# Patient Record
Sex: FEMALE | Race: WHITE
[De-identification: names, ages, dates, MRNs, and addresses within clinical notes are randomized per-mention and may not be internally consistent; named-entity substitution may affect disease eponyms.]

---

## 2020-03-10 ENCOUNTER — HOSPITAL ENCOUNTER (OUTPATIENT)
Dept: HOSPITAL 19 - SDCO | Age: 65
Discharge: HOME | End: 2020-03-10
Attending: SURGERY
Payer: COMMERCIAL

## 2020-03-10 VITALS — DIASTOLIC BLOOD PRESSURE: 61 MMHG | HEART RATE: 65 BPM | SYSTOLIC BLOOD PRESSURE: 99 MMHG

## 2020-03-10 VITALS — WEIGHT: 213.63 LBS | HEIGHT: 63 IN | BODY MASS INDEX: 37.85 KG/M2

## 2020-03-10 VITALS — SYSTOLIC BLOOD PRESSURE: 81 MMHG | DIASTOLIC BLOOD PRESSURE: 53 MMHG | HEART RATE: 73 BPM

## 2020-03-10 VITALS — DIASTOLIC BLOOD PRESSURE: 53 MMHG | HEART RATE: 56 BPM | SYSTOLIC BLOOD PRESSURE: 94 MMHG

## 2020-03-10 VITALS — TEMPERATURE: 97.4 F | HEART RATE: 78 BPM | SYSTOLIC BLOOD PRESSURE: 86 MMHG | DIASTOLIC BLOOD PRESSURE: 52 MMHG

## 2020-03-10 VITALS — TEMPERATURE: 97.3 F | HEART RATE: 74 BPM | DIASTOLIC BLOOD PRESSURE: 54 MMHG | SYSTOLIC BLOOD PRESSURE: 95 MMHG

## 2020-03-10 DIAGNOSIS — C50.412: ICD-10-CM

## 2020-03-10 DIAGNOSIS — T82.599A: Primary | ICD-10-CM

## 2020-03-10 DIAGNOSIS — I50.9: ICD-10-CM

## 2020-03-10 DIAGNOSIS — Z79.51: ICD-10-CM

## 2020-03-10 DIAGNOSIS — M81.0: ICD-10-CM

## 2020-03-10 DIAGNOSIS — I42.9: ICD-10-CM

## 2020-03-10 DIAGNOSIS — Z88.0: ICD-10-CM

## 2020-03-10 NOTE — NUR
TO PER CART FROM OR. ALERT ORIENTED X 3, TALKING TO STAFF AND DAUGHTER.
PORT ACCESSED FOR CHEMO TX IN AM.  DRESSING OVER SITE WITHOUT DRAINAGE.

## 2020-03-10 NOTE — NUR
RECEIVED DISCHARGE INSTRUCTIONS AND VERBALIZED UNDERSTANDING.
DISCONTINUED IV AND INT- CATHETER INTACT
DAUGHTER ASSISTING PATIENT DRESSED.

## 2020-04-07 ENCOUNTER — HOSPITAL ENCOUNTER (INPATIENT)
Dept: HOSPITAL 19 - COL.ER | Age: 65
LOS: 2 days | Discharge: HOME | DRG: 683 | End: 2020-04-09
Attending: FAMILY MEDICINE | Admitting: FAMILY MEDICINE
Payer: COMMERCIAL

## 2020-04-07 VITALS — TEMPERATURE: 98.1 F | SYSTOLIC BLOOD PRESSURE: 104 MMHG | HEART RATE: 68 BPM | DIASTOLIC BLOOD PRESSURE: 56 MMHG

## 2020-04-07 VITALS — WEIGHT: 211.2 LBS | BODY MASS INDEX: 37.42 KG/M2 | HEIGHT: 62.99 IN

## 2020-04-07 DIAGNOSIS — I95.9: ICD-10-CM

## 2020-04-07 DIAGNOSIS — I47.2: ICD-10-CM

## 2020-04-07 DIAGNOSIS — I42.9: ICD-10-CM

## 2020-04-07 DIAGNOSIS — N39.0: ICD-10-CM

## 2020-04-07 DIAGNOSIS — C79.51: ICD-10-CM

## 2020-04-07 DIAGNOSIS — R19.7: ICD-10-CM

## 2020-04-07 DIAGNOSIS — N17.9: Primary | ICD-10-CM

## 2020-04-07 DIAGNOSIS — E83.42: ICD-10-CM

## 2020-04-07 DIAGNOSIS — Z95.810: ICD-10-CM

## 2020-04-07 DIAGNOSIS — Z88.0: ICD-10-CM

## 2020-04-07 DIAGNOSIS — D64.9: ICD-10-CM

## 2020-04-07 DIAGNOSIS — C50.412: ICD-10-CM

## 2020-04-07 DIAGNOSIS — K20.9: ICD-10-CM

## 2020-04-07 DIAGNOSIS — R53.81: ICD-10-CM

## 2020-04-07 DIAGNOSIS — I50.9: ICD-10-CM

## 2020-04-07 DIAGNOSIS — Z66: ICD-10-CM

## 2020-04-07 DIAGNOSIS — M81.0: ICD-10-CM

## 2020-04-07 DIAGNOSIS — E86.0: ICD-10-CM

## 2020-04-07 LAB
ALBUMIN SERPL-MCNC: 4.2 GM/DL (ref 3.5–5)
ALP SERPL-CCNC: 102 U/L (ref 50–136)
ALT SERPL-CCNC: 10 U/L (ref 4–34)
ANION GAP SERPL CALC-SCNC: 10 MMOL/L (ref 7–16)
AST SERPL-CCNC: 25 U/L (ref 15–37)
BASOPHILS # BLD: 0 10*3/UL (ref 0–0.2)
BASOPHILS NFR BLD AUTO: 1 % (ref 0–2)
BILIRUB SERPL-MCNC: 0.4 MG/DL (ref 0–1)
BUN SERPL-MCNC: 63 MG/DL (ref 7–17)
CALCIUM SERPL-MCNC: 9.1 MG/DL (ref 8.4–10.2)
CHLORIDE SERPL-SCNC: 109 MMOL/L (ref 98–107)
CO2 SERPL-SCNC: 15 MMOL/L (ref 22–30)
CREAT SERPL-SCNC: 4.38 UMOL/L (ref 0.52–1.25)
CRP SERPL-MCNC: < 0.5 MG/DL (ref 0–0.9)
EOSINOPHIL # BLD: 0 10*3/UL (ref 0–0.7)
EOSINOPHIL NFR BLD: 1 % (ref 0–4)
ERYTHROCYTE [DISTWIDTH] IN BLOOD BY AUTOMATED COUNT: 17.2 % (ref 11.5–14.5)
GLUCOSE SERPL-MCNC: 95 MG/DL (ref 74–106)
GRANULOCYTES # BLD AUTO: 52.9 % (ref 42.2–75.2)
HCT VFR BLD AUTO: 29.5 % (ref 37–47)
HGB BLD-MCNC: 9.6 G/DL (ref 12.5–16)
LYMPHOCYTES # BLD: 1.6 10*3/UL (ref 1.2–3.4)
LYMPHOCYTES NFR BLD: 38.2 % (ref 20–51)
MAGNESIUM SERPL-MCNC: 1.5 MG/DL (ref 1.6–2.3)
MCH RBC QN AUTO: 32 PG (ref 27–31)
MCHC RBC AUTO-ENTMCNC: 33 G/DL (ref 33–37)
MCV RBC AUTO: 99 FL (ref 80–100)
MONOCYTES # BLD: 0.3 10*3/UL (ref 0.1–0.6)
MONOCYTES NFR BLD AUTO: 6.4 % (ref 1.7–9.3)
NEUTROPHILS # BLD: 2.2 10*3/UL (ref 1.4–6.5)
OSMOLALITY UR: 369 OSM/KG (ref 50–1200)
PH UR STRIP.AUTO: 5 [PH] (ref 5–8)
PHOSPHATE SERPL-MCNC: 4.4 MG/DL (ref 2.5–4.5)
PLATELET # BLD AUTO: 319 K/MM3 (ref 130–400)
PMV BLD AUTO: 10.2 FL (ref 7.4–10.4)
POTASSIUM SERPL-SCNC: 4.2 MMOL/L (ref 3.4–5)
PROT SERPL-MCNC: 6.8 GM/DL (ref 6.4–8.2)
RBC # BLD AUTO: 2.99 M/MM3 (ref 4.1–5.3)
RBC # UR STRIP.AUTO: (no result) /UL
RBC # UR: (no result) /HPF
SODIUM SERPL-SCNC: 135 MMOL/L (ref 137–145)
SODIUM UR-SCNC: 24 MMOL/L
SP GR UR STRIP.AUTO: 1.01 (ref 1–1.03)
SQUAMOUS # URNS: (no result) /HPF
UA DIPSTICK PNL UR STRIP.AUTO: (no result)
URINE LEUKOCYTE ESTERASE: (no result)
URN COLLECT METHOD CLASS: (no result)

## 2020-04-07 PROCEDURE — C9113 INJ PANTOPRAZOLE SODIUM, VIA: HCPCS

## 2020-04-07 PROCEDURE — A4216 STERILE WATER/SALINE, 10 ML: HCPCS

## 2020-04-08 VITALS — DIASTOLIC BLOOD PRESSURE: 48 MMHG | TEMPERATURE: 97.8 F | HEART RATE: 52 BPM | SYSTOLIC BLOOD PRESSURE: 80 MMHG

## 2020-04-08 VITALS — DIASTOLIC BLOOD PRESSURE: 44 MMHG | HEART RATE: 67 BPM | SYSTOLIC BLOOD PRESSURE: 86 MMHG

## 2020-04-08 VITALS — SYSTOLIC BLOOD PRESSURE: 77 MMHG | DIASTOLIC BLOOD PRESSURE: 48 MMHG | HEART RATE: 60 BPM

## 2020-04-08 VITALS — DIASTOLIC BLOOD PRESSURE: 43 MMHG | SYSTOLIC BLOOD PRESSURE: 86 MMHG | HEART RATE: 66 BPM

## 2020-04-08 VITALS — DIASTOLIC BLOOD PRESSURE: 50 MMHG | SYSTOLIC BLOOD PRESSURE: 90 MMHG | TEMPERATURE: 97.7 F | HEART RATE: 60 BPM

## 2020-04-08 VITALS — SYSTOLIC BLOOD PRESSURE: 85 MMHG | TEMPERATURE: 97.8 F | DIASTOLIC BLOOD PRESSURE: 52 MMHG | HEART RATE: 61 BPM

## 2020-04-08 VITALS — SYSTOLIC BLOOD PRESSURE: 99 MMHG | HEART RATE: 69 BPM | DIASTOLIC BLOOD PRESSURE: 51 MMHG | TEMPERATURE: 97 F

## 2020-04-08 VITALS — HEART RATE: 58 BPM | DIASTOLIC BLOOD PRESSURE: 55 MMHG | SYSTOLIC BLOOD PRESSURE: 95 MMHG

## 2020-04-08 VITALS — DIASTOLIC BLOOD PRESSURE: 35 MMHG | HEART RATE: 72 BPM | SYSTOLIC BLOOD PRESSURE: 71 MMHG

## 2020-04-08 VITALS — DIASTOLIC BLOOD PRESSURE: 43 MMHG | SYSTOLIC BLOOD PRESSURE: 88 MMHG

## 2020-04-08 VITALS — SYSTOLIC BLOOD PRESSURE: 91 MMHG | DIASTOLIC BLOOD PRESSURE: 52 MMHG | TEMPERATURE: 97.9 F | HEART RATE: 58 BPM

## 2020-04-08 VITALS — HEART RATE: 69 BPM | DIASTOLIC BLOOD PRESSURE: 49 MMHG | TEMPERATURE: 98.1 F | SYSTOLIC BLOOD PRESSURE: 84 MMHG

## 2020-04-08 VITALS — HEART RATE: 59 BPM | SYSTOLIC BLOOD PRESSURE: 98 MMHG | DIASTOLIC BLOOD PRESSURE: 55 MMHG

## 2020-04-08 VITALS — HEART RATE: 66 BPM | TEMPERATURE: 98 F | DIASTOLIC BLOOD PRESSURE: 48 MMHG | SYSTOLIC BLOOD PRESSURE: 89 MMHG

## 2020-04-08 VITALS — DIASTOLIC BLOOD PRESSURE: 54 MMHG | SYSTOLIC BLOOD PRESSURE: 96 MMHG | HEART RATE: 66 BPM

## 2020-04-08 VITALS — TEMPERATURE: 97.7 F | SYSTOLIC BLOOD PRESSURE: 76 MMHG | HEART RATE: 55 BPM | DIASTOLIC BLOOD PRESSURE: 55 MMHG

## 2020-04-08 LAB
ALBUMIN SERPL-MCNC: 3.6 GM/DL (ref 3.5–5)
ALP SERPL-CCNC: 95 U/L (ref 50–136)
ALT SERPL-CCNC: 9 U/L (ref 4–34)
ANION GAP SERPL CALC-SCNC: 7 MMOL/L (ref 7–16)
AST SERPL-CCNC: 28 U/L (ref 15–37)
BASOPHILS # BLD: 0 10*3/UL (ref 0–0.2)
BASOPHILS NFR BLD AUTO: 0.3 % (ref 0–2)
BILIRUB SERPL-MCNC: 0.3 MG/DL (ref 0–1)
BUN SERPL-MCNC: 47 MG/DL (ref 7–17)
CALCIUM SERPL-MCNC: 9 MG/DL (ref 8.4–10.2)
CHLORIDE SERPL-SCNC: 113 MMOL/L (ref 98–107)
CO2 SERPL-SCNC: 16 MMOL/L (ref 22–30)
CREAT SERPL-SCNC: 2.86 UMOL/L (ref 0.52–1.25)
EOSINOPHIL # BLD: 0 10*3/UL (ref 0–0.7)
EOSINOPHIL NFR BLD: 0 % (ref 0–4)
ERYTHROCYTE [DISTWIDTH] IN BLOOD BY AUTOMATED COUNT: 17.2 % (ref 11.5–14.5)
GLUCOSE SERPL-MCNC: 91 MG/DL (ref 74–106)
GRANULOCYTES # BLD AUTO: 62.5 % (ref 42.2–75.2)
HCT VFR BLD AUTO: 26.6 % (ref 37–47)
HGB BLD-MCNC: 8.8 G/DL (ref 12.5–16)
LYMPHOCYTES # BLD: 1.3 10*3/UL (ref 1.2–3.4)
LYMPHOCYTES NFR BLD: 33.2 % (ref 20–51)
MAGNESIUM SERPL-MCNC: 1.8 MG/DL (ref 1.6–2.3)
MCH RBC QN AUTO: 33 PG (ref 27–31)
MCHC RBC AUTO-ENTMCNC: 33 G/DL (ref 33–37)
MCV RBC AUTO: 101 FL (ref 80–100)
MONOCYTES # BLD: 0.1 10*3/UL (ref 0.1–0.6)
MONOCYTES NFR BLD AUTO: 3.5 % (ref 1.7–9.3)
NEUTROPHILS # BLD: 2.4 10*3/UL (ref 1.4–6.5)
PLATELET # BLD AUTO: 261 K/MM3 (ref 130–400)
PMV BLD AUTO: 10.2 FL (ref 7.4–10.4)
POTASSIUM SERPL-SCNC: 4.2 MMOL/L (ref 3.4–5)
PROT SERPL-MCNC: 6 GM/DL (ref 6.4–8.2)
RBC # BLD AUTO: 2.64 M/MM3 (ref 4.1–5.3)
SODIUM SERPL-SCNC: 136 MMOL/L (ref 137–145)

## 2020-04-08 NOTE — NUR
Patient currently resting in bedside recliner eating dinner. Patient tolerated
clear liquids well all shift, no reports of nausea and no episodes of emesis.
Patient has denied pain today. Blood pressures remain low but stable. Patient
denies needs at this time, call light within reach.

## 2020-04-08 NOTE — NUR
Patient to the floor via bed. UA obtained and sent to lab. IVF started.
Magnesium replaced per orders. Patient denies pain. States nausea is a lot
better since getting fluids in ED. Port to right upper chest accessed.
Stand-by assist to bathroom. Remains hypotensive. EVELYN Palma, aware
and ordered hourly vitals checks. Patient states she is feeling much better
and her dizziness has passed. Ambulates with steady gait. Heparin for VTE. 5
page completed. Med rec completed. Will continue to monitor patient.

## 2020-04-08 NOTE — NUR
HAILEE met with the patient to complete initial intake. The patient lives in
[place] with her , Bryant. The patient denies DME usage and is
independent with ADLs. The patient's PCP is Dr. Jaramillo and patient receives
medications from [facility]. The patient does not have advanced
directives in the EMR but was interested in a DPOA-HC form. Form provided. The
patient plans to return home at discharge with Bryant providing
transportation. Will continue to monitor.

## 2020-04-08 NOTE — NUR
PATIENT SLEEPING, DOES NOT AWAKEN WHEN ROOM ENTERED BY STAFF.  OBSERVED
BREATHING AS NONLABORED AND EVEN.  PATIENT CONTINUES ON TELE.

## 2020-04-09 VITALS — SYSTOLIC BLOOD PRESSURE: 84 MMHG | HEART RATE: 62 BPM | DIASTOLIC BLOOD PRESSURE: 56 MMHG | TEMPERATURE: 97.5 F

## 2020-04-09 VITALS — DIASTOLIC BLOOD PRESSURE: 51 MMHG | TEMPERATURE: 97.8 F | SYSTOLIC BLOOD PRESSURE: 86 MMHG | HEART RATE: 53 BPM

## 2020-04-09 VITALS — HEART RATE: 60 BPM | DIASTOLIC BLOOD PRESSURE: 55 MMHG | TEMPERATURE: 97.6 F | SYSTOLIC BLOOD PRESSURE: 102 MMHG

## 2020-04-09 LAB
ANION GAP SERPL CALC-SCNC: 5 MMOL/L (ref 7–16)
BASOPHILS # BLD: 0 10*3/UL (ref 0–0.2)
BASOPHILS NFR BLD AUTO: 0.4 % (ref 0–2)
BUN SERPL-MCNC: 32 MG/DL (ref 7–17)
CALCIUM SERPL-MCNC: 9 MG/DL (ref 8.4–10.2)
CHLORIDE SERPL-SCNC: 114 MMOL/L (ref 98–107)
CO2 SERPL-SCNC: 19 MMOL/L (ref 22–30)
CREAT SERPL-SCNC: 1.7 UMOL/L (ref 0.52–1.25)
EOSINOPHIL # BLD: 0 10*3/UL (ref 0–0.7)
EOSINOPHIL NFR BLD: 0 % (ref 0–4)
ERYTHROCYTE [DISTWIDTH] IN BLOOD BY AUTOMATED COUNT: 17.5 % (ref 11.5–14.5)
GLUCOSE SERPL-MCNC: 75 MG/DL (ref 74–106)
GRANULOCYTES # BLD AUTO: 35.3 % (ref 42.2–75.2)
HCT VFR BLD AUTO: 25.6 % (ref 37–47)
HGB BLD-MCNC: 8.2 G/DL (ref 12.5–16)
LYMPHOCYTES # BLD: 2.6 10*3/UL (ref 1.2–3.4)
LYMPHOCYTES NFR BLD: 54.2 % (ref 20–51)
MAGNESIUM SERPL-MCNC: 1.7 MG/DL (ref 1.6–2.3)
MCH RBC QN AUTO: 32 PG (ref 27–31)
MCHC RBC AUTO-ENTMCNC: 32 G/DL (ref 33–37)
MCV RBC AUTO: 100 FL (ref 80–100)
MONOCYTES # BLD: 0.5 10*3/UL (ref 0.1–0.6)
MONOCYTES NFR BLD AUTO: 9.5 % (ref 1.7–9.3)
NEUTROPHILS # BLD: 1.7 10*3/UL (ref 1.4–6.5)
PLATELET # BLD AUTO: 253 K/MM3 (ref 130–400)
PMV BLD AUTO: 10.3 FL (ref 7.4–10.4)
POTASSIUM SERPL-SCNC: 4 MMOL/L (ref 3.4–5)
RBC # BLD AUTO: 2.57 M/MM3 (ref 4.1–5.3)
SODIUM SERPL-SCNC: 138 MMOL/L (ref 137–145)

## 2020-04-09 NOTE — NUR
Patient resting in bed during change of shift report given to day shift nurseNaina and student nurse, Grady.  Tele continues. Patient up in room
independently.

## 2020-04-09 NOTE — NUR
PATIENT SLEEPING, DOES NOT AWAKEN WHEN DOOR TO ROOM IS OPENED BY STAFF,
BREATHING NONLABORED AND EVEN. TELE CONTINUES.

## 2020-04-09 NOTE — NUR
SEE [facility] FOR VS TAKEN, PATIENT AWAKENS EASILY WITH NO C/O INITIALLY OF
CHEST PAIN OR SHORTNESS OF BREATH.  DENIES ANY NEEDS OR CONCERNS.

## 2020-04-09 NOTE — NUR
TELE CALLED, PATIENT HAS HEART DROP TO 40'S WITH SLEEP, [place] [facility]IVAS CALLED
BY CHARGE NURSE, DONNY, TO GET ORDER FOR TELE PARAMETERS CHANGE.

## 2020-04-09 NOTE — NUR
CALLED, TELE, SPOKE WITH CLARIBEL/MONITOR TECH, DETERMINED ABN RHYTHM/WITH ICU
STAFF RN PATIENT HAD 2 SEPARATE BEATS OF P.A.T. WITH 1ST EPISODE OF 11 BEATS
OF P.A.T. AND 2ND EPISODE OF 7 BEATS OF P.A.T. THAT CHANGED BACK TO PATIENT'S
NORM RHYTHM IN THE 50'S.  INFORMED TECH OF PATIENT'S VS TAKEN, WITH PATIENT
DENYING CHEST PAIN/SHORTNESS OF BREATH.

## 2020-04-09 NOTE — NUR
PT A&O*3. VSS. HELD BETA BLOCKER DUE TO HEART RATE IN THE 50'S ON TELE. ALSO
NOTED SOFT B/P. CONFIRMED HOLD WITH HOSPITALIST. PATIENT DENIES PAIN. ADMIT
ON 4/7 SINCE NORBERTO/HYPOTENSION. NO ABNORMAL FINDINGS ON HEAD TO ASSESSMENT. PT
HAS CRUSTED LESION ON LEFT FOOT WITH NO DRESSING ON IT. PT EATING AND VOIDING
INDEPENDENTLY. NO C/O N/V. ON IV ABX FOR UTI. PT HAS CENTRAL PORT ON RIGHT
CHEST TO INT.  PT IS SITTING ON CHAIR, CALL LIGHT WITHIN REACH.

## 2020-04-09 NOTE — NUR
TELE CALLED, REPORTED OBSERVING HEART RATE TO 40'S WHILE PATIENT
SLEEPS, SETTING OFF ALARMS, OBSERVED PATIENT SLEEPING WITH NONLABORED/EVEN
BREATHING.

## 2020-07-02 ENCOUNTER — HOSPITAL ENCOUNTER (OUTPATIENT)
Dept: HOSPITAL 19 - COL.RAD | Age: 65
End: 2020-07-02
Payer: COMMERCIAL

## 2020-07-02 DIAGNOSIS — Z95.9: ICD-10-CM

## 2020-07-02 DIAGNOSIS — C79.51: ICD-10-CM

## 2020-07-02 DIAGNOSIS — C50.412: Primary | ICD-10-CM

## 2020-07-02 PROCEDURE — A9503 TC99M MEDRONATE: HCPCS

## 2020-07-23 ENCOUNTER — HOSPITAL ENCOUNTER (OUTPATIENT)
Dept: HOSPITAL 19 - COL.RAD | Age: 65
End: 2020-07-23
Attending: UROLOGY
Payer: COMMERCIAL

## 2020-07-23 DIAGNOSIS — Q62.11: Primary | ICD-10-CM

## 2020-07-23 PROCEDURE — A9562 TC99M MERTIATIDE: HCPCS

## 2020-08-28 ENCOUNTER — HOSPITAL ENCOUNTER (OUTPATIENT)
Dept: HOSPITAL 19 - SDCO | Age: 65
Discharge: HOME | End: 2020-08-28
Attending: UROLOGY
Payer: COMMERCIAL

## 2020-08-28 VITALS — DIASTOLIC BLOOD PRESSURE: 47 MMHG | SYSTOLIC BLOOD PRESSURE: 87 MMHG | HEART RATE: 84 BPM

## 2020-08-28 VITALS — SYSTOLIC BLOOD PRESSURE: 101 MMHG | DIASTOLIC BLOOD PRESSURE: 58 MMHG | HEART RATE: 85 BPM

## 2020-08-28 VITALS — BODY MASS INDEX: 34.61 KG/M2 | WEIGHT: 195.33 LBS | HEIGHT: 63 IN

## 2020-08-28 VITALS — HEART RATE: 84 BPM | SYSTOLIC BLOOD PRESSURE: 90 MMHG | DIASTOLIC BLOOD PRESSURE: 52 MMHG | TEMPERATURE: 97.3 F

## 2020-08-28 VITALS — SYSTOLIC BLOOD PRESSURE: 83 MMHG | HEART RATE: 95 BPM | TEMPERATURE: 98.2 F | DIASTOLIC BLOOD PRESSURE: 53 MMHG

## 2020-08-28 DIAGNOSIS — Z20.828: ICD-10-CM

## 2020-08-28 DIAGNOSIS — M81.0: ICD-10-CM

## 2020-08-28 DIAGNOSIS — G89.29: ICD-10-CM

## 2020-08-28 DIAGNOSIS — F32.9: ICD-10-CM

## 2020-08-28 DIAGNOSIS — C79.51: ICD-10-CM

## 2020-08-28 DIAGNOSIS — I50.9: ICD-10-CM

## 2020-08-28 DIAGNOSIS — M19.90: ICD-10-CM

## 2020-08-28 DIAGNOSIS — Z79.899: ICD-10-CM

## 2020-08-28 DIAGNOSIS — C50.919: ICD-10-CM

## 2020-08-28 DIAGNOSIS — N13.0: Primary | ICD-10-CM

## 2020-08-28 DIAGNOSIS — Z88.0: ICD-10-CM

## 2020-08-28 DIAGNOSIS — Z95.810: ICD-10-CM

## 2020-08-28 PROCEDURE — C1769 GUIDE WIRE: HCPCS

## 2020-08-28 PROCEDURE — C2617 STENT, NON-COR, TEM W/O DEL: HCPCS

## 2020-08-28 NOTE — NUR
Dismissal instructions gone over with patient and patient's daughter. Both
verbalize understanding and all questions answered.

## 2020-08-28 NOTE — NUR
Patient tolerates soda and crackers without any nausea. Reports bladder
cramping, denies wanting any pain medication. Patient reports she is ready to
go home. Vitals stable.

## 2020-08-28 NOTE — NUR
Patient arrives back to SDC alert, denies nausea, reports slight
cramping/discomfort feeling/urge to urinate. Patient monitor applied, vitals
stable. Patient tolerating ice chips well. Daughter at bedside. Patient given
crackers and soda.

## 2020-08-28 NOTE — NUR
Patient discharged to private vehicle at patient enterance via wheelchair
without any complications. Patient and family leave thanking staff for
services.

## 2020-10-20 ENCOUNTER — HOSPITAL ENCOUNTER (INPATIENT)
Dept: HOSPITAL 19 - COL.ER | Age: 65
LOS: 8 days | Discharge: HOSPICE | DRG: 871 | End: 2020-10-28
Attending: INTERNAL MEDICINE | Admitting: INTERNAL MEDICINE
Payer: COMMERCIAL

## 2020-10-20 VITALS — WEIGHT: 242.73 LBS | HEIGHT: 62.99 IN | BODY MASS INDEX: 43.01 KG/M2

## 2020-10-20 VITALS — TEMPERATURE: 97.4 F | HEART RATE: 84 BPM | SYSTOLIC BLOOD PRESSURE: 86 MMHG | DIASTOLIC BLOOD PRESSURE: 58 MMHG

## 2020-10-20 DIAGNOSIS — I34.0: ICD-10-CM

## 2020-10-20 DIAGNOSIS — C50.912: ICD-10-CM

## 2020-10-20 DIAGNOSIS — I42.9: ICD-10-CM

## 2020-10-20 DIAGNOSIS — D72.819: ICD-10-CM

## 2020-10-20 DIAGNOSIS — I95.9: ICD-10-CM

## 2020-10-20 DIAGNOSIS — N39.0: ICD-10-CM

## 2020-10-20 DIAGNOSIS — A41.51: Primary | ICD-10-CM

## 2020-10-20 DIAGNOSIS — I47.2: ICD-10-CM

## 2020-10-20 DIAGNOSIS — E87.3: ICD-10-CM

## 2020-10-20 DIAGNOSIS — J96.01: ICD-10-CM

## 2020-10-20 DIAGNOSIS — E83.42: ICD-10-CM

## 2020-10-20 DIAGNOSIS — Z95.810: ICD-10-CM

## 2020-10-20 DIAGNOSIS — R65.21: ICD-10-CM

## 2020-10-20 DIAGNOSIS — N17.9: ICD-10-CM

## 2020-10-20 DIAGNOSIS — I45.81: ICD-10-CM

## 2020-10-20 DIAGNOSIS — D64.9: ICD-10-CM

## 2020-10-20 DIAGNOSIS — I48.0: ICD-10-CM

## 2020-10-20 DIAGNOSIS — Z88.0: ICD-10-CM

## 2020-10-20 DIAGNOSIS — E87.1: ICD-10-CM

## 2020-10-20 DIAGNOSIS — J18.9: ICD-10-CM

## 2020-10-20 DIAGNOSIS — D61.818: ICD-10-CM

## 2020-10-20 DIAGNOSIS — R53.81: ICD-10-CM

## 2020-10-20 DIAGNOSIS — C79.51: ICD-10-CM

## 2020-10-20 DIAGNOSIS — N18.9: ICD-10-CM

## 2020-10-20 DIAGNOSIS — E87.2: ICD-10-CM

## 2020-10-20 DIAGNOSIS — E86.0: ICD-10-CM

## 2020-10-20 LAB
ALBUMIN SERPL-MCNC: 4.3 GM/DL (ref 3.5–5)
ALP SERPL-CCNC: 113 U/L (ref 50–136)
ALT SERPL-CCNC: 12 U/L (ref 4–34)
ANION GAP SERPL CALC-SCNC: 13 MMOL/L (ref 7–16)
AST SERPL-CCNC: 26 U/L (ref 15–37)
BASOPHILS # BLD: 0 10*3/UL (ref 0–0.2)
BASOPHILS NFR BLD AUTO: 0.8 % (ref 0–2)
BILIRUB SERPL-MCNC: 0.5 MG/DL (ref 0–1)
BUN SERPL-MCNC: 49 MG/DL (ref 7–17)
CALCIUM SERPL-MCNC: 9.3 MG/DL (ref 8.4–10.2)
CHLORIDE SERPL-SCNC: 104 MMOL/L (ref 98–107)
CO2 SERPL-SCNC: 17 MMOL/L (ref 22–30)
CREAT SERPL-SCNC: 4.41 UMOL/L (ref 0.52–1.25)
CRP SERPL-MCNC: < 0.5 MG/DL (ref 0–0.9)
EOSINOPHIL # BLD: 0.1 10*3/UL (ref 0–0.7)
EOSINOPHIL NFR BLD: 4.5 % (ref 0–4)
ERYTHROCYTE [DISTWIDTH] IN BLOOD BY AUTOMATED COUNT: 17.4 % (ref 11.5–14.5)
GLUCOSE SERPL-MCNC: 84 MG/DL (ref 74–106)
GRANULOCYTES # BLD AUTO: 50.7 % (ref 42.2–75.2)
HCT VFR BLD AUTO: 29.1 % (ref 37–47)
HGB BLD-MCNC: 9.6 G/DL (ref 12.5–16)
INR BLD: 1.1 (ref 0.8–3)
LYMPHOCYTES # BLD: 0.9 10*3/UL (ref 1.2–3.4)
LYMPHOCYTES NFR BLD: 37 % (ref 20–51)
MCH RBC QN AUTO: 33 PG (ref 27–31)
MCHC RBC AUTO-ENTMCNC: 33 G/DL (ref 33–37)
MCV RBC AUTO: 99 FL (ref 80–100)
MONOCYTES # BLD: 0.2 10*3/UL (ref 0.1–0.6)
MONOCYTES NFR BLD AUTO: 6.6 % (ref 1.7–9.3)
NEUTROPHILS # BLD: 1.2 10*3/UL (ref 1.4–6.5)
PH UR STRIP.AUTO: 5 [PH] (ref 5–8)
PLATELET # BLD AUTO: 340 K/MM3 (ref 130–400)
PMV BLD AUTO: 9.5 FL (ref 7.4–10.4)
POTASSIUM SERPL-SCNC: 3.9 MMOL/L (ref 3.4–5)
PROT SERPL-MCNC: 6.8 GM/DL (ref 6.4–8.2)
PROTHROMBIN TIME: 11.8 SECONDS (ref 9.7–12.8)
RBC # BLD AUTO: 2.94 M/MM3 (ref 4.1–5.3)
RBC # UR STRIP.AUTO: (no result) /UL
RBC # UR: (no result) /HPF
SODIUM SERPL-SCNC: 134 MMOL/L (ref 137–145)
SP GR UR STRIP.AUTO: 1.01 (ref 1–1.03)
TROPONIN I SERPL-MCNC: 0.03 NG/ML (ref 0–0.04)
UA DIPSTICK PNL UR STRIP.AUTO: (no result)
URINE LEUKOCYTE ESTERASE: (no result)
URN COLLECT METHOD CLASS: (no result)

## 2020-10-20 PROCEDURE — P9040 RBC LEUKOREDUCED IRRADIATED: HCPCS

## 2020-10-20 PROCEDURE — C1892 INTRO/SHEATH,FIXED,PEEL-AWAY: HCPCS

## 2020-10-20 PROCEDURE — C1751 CATH, INF, PER/CENT/MIDLINE: HCPCS

## 2020-10-20 PROCEDURE — A4314 CATH W/DRAINAGE 2-WAY LATEX: HCPCS

## 2020-10-20 PROCEDURE — A9284 NON-ELECTRONIC SPIROMETER: HCPCS

## 2020-10-20 PROCEDURE — C9113 INJ PANTOPRAZOLE SODIUM, VIA: HCPCS

## 2020-10-20 NOTE — NUR
Patient arrived to medical unit from ER at approximately 2130. Alert and
oriented x 4, and able to make needs known. Reports pain all over, achy
feeling. Peripheral INT to left AC. NS running at 125 ml/hr per orders. Has
port to right chest, not accessed at this time. Unsure what size neele to use,
talked to house supervisor, and recommened to use peripheral for tonight and
call MD office tomorrow to get size. Patient voiced understanding. Denies
having SOB and dyspnea. LS CTA in upper lobes, diminished in lower.
Respirations even and unlabored. HRR. Telemetry in place. Capillary refill
less htan 3 seconds. Non-tenting skin turgor. BSAx4. Abdomen soft and
non-tender. No edema. Bladder scanned with a result of approximately 75 ml.
Patient states that she always feels like she has to pee, but only dribbles
small amounts. Indwelling willis catheter placed per orders. 100 ml cloudy
yellow urine, sent UA to lab. Patient systolic BP continues to be in the 80s,
which patient reports is her usual. Denies dizziness. Voices no questions,
needs, or concerns at this time. Resting in bed with call light within reach.

## 2020-10-21 VITALS — OXYGEN SATURATION: 95 %

## 2020-10-21 VITALS — OXYGEN SATURATION: 99 %

## 2020-10-21 VITALS — DIASTOLIC BLOOD PRESSURE: 41 MMHG | HEART RATE: 71 BPM | TEMPERATURE: 97.9 F | SYSTOLIC BLOOD PRESSURE: 77 MMHG

## 2020-10-21 VITALS — OXYGEN SATURATION: 96 %

## 2020-10-21 VITALS — OXYGEN SATURATION: 97 %

## 2020-10-21 VITALS — OXYGEN SATURATION: 98 %

## 2020-10-21 VITALS — OXYGEN SATURATION: 100 %

## 2020-10-21 VITALS — DIASTOLIC BLOOD PRESSURE: 51 MMHG | SYSTOLIC BLOOD PRESSURE: 69 MMHG

## 2020-10-21 VITALS — DIASTOLIC BLOOD PRESSURE: 47 MMHG | TEMPERATURE: 97.8 F | SYSTOLIC BLOOD PRESSURE: 71 MMHG | HEART RATE: 75 BPM

## 2020-10-21 VITALS — DIASTOLIC BLOOD PRESSURE: 71 MMHG | TEMPERATURE: 97.9 F | HEART RATE: 70 BPM | SYSTOLIC BLOOD PRESSURE: 109 MMHG

## 2020-10-21 VITALS — SYSTOLIC BLOOD PRESSURE: 72 MMHG | HEART RATE: 65 BPM | DIASTOLIC BLOOD PRESSURE: 44 MMHG | TEMPERATURE: 98.1 F

## 2020-10-21 VITALS — SYSTOLIC BLOOD PRESSURE: 97 MMHG | DIASTOLIC BLOOD PRESSURE: 47 MMHG

## 2020-10-21 VITALS — SYSTOLIC BLOOD PRESSURE: 73 MMHG | DIASTOLIC BLOOD PRESSURE: 41 MMHG

## 2020-10-21 VITALS — DIASTOLIC BLOOD PRESSURE: 54 MMHG | SYSTOLIC BLOOD PRESSURE: 73 MMHG

## 2020-10-21 VITALS — DIASTOLIC BLOOD PRESSURE: 71 MMHG | SYSTOLIC BLOOD PRESSURE: 109 MMHG

## 2020-10-21 VITALS
OXYGEN SATURATION: 99 % | SYSTOLIC BLOOD PRESSURE: 92 MMHG | DIASTOLIC BLOOD PRESSURE: 65 MMHG | HEART RATE: 70 BPM | TEMPERATURE: 97.9 F

## 2020-10-21 VITALS — SYSTOLIC BLOOD PRESSURE: 73 MMHG | DIASTOLIC BLOOD PRESSURE: 43 MMHG

## 2020-10-21 VITALS — OXYGEN SATURATION: 94 %

## 2020-10-21 VITALS — SYSTOLIC BLOOD PRESSURE: 84 MMHG | DIASTOLIC BLOOD PRESSURE: 45 MMHG | HEART RATE: 66 BPM | OXYGEN SATURATION: 96 %

## 2020-10-21 VITALS — OXYGEN SATURATION: 91 %

## 2020-10-21 VITALS — DIASTOLIC BLOOD PRESSURE: 54 MMHG | SYSTOLIC BLOOD PRESSURE: 81 MMHG

## 2020-10-21 VITALS — SYSTOLIC BLOOD PRESSURE: 78 MMHG | DIASTOLIC BLOOD PRESSURE: 54 MMHG

## 2020-10-21 VITALS — OXYGEN SATURATION: 92 %

## 2020-10-21 VITALS — DIASTOLIC BLOOD PRESSURE: 63 MMHG | SYSTOLIC BLOOD PRESSURE: 76 MMHG

## 2020-10-21 VITALS — DIASTOLIC BLOOD PRESSURE: 50 MMHG | SYSTOLIC BLOOD PRESSURE: 93 MMHG

## 2020-10-21 VITALS — HEART RATE: 59 BPM | SYSTOLIC BLOOD PRESSURE: 109 MMHG | DIASTOLIC BLOOD PRESSURE: 59 MMHG

## 2020-10-21 VITALS — SYSTOLIC BLOOD PRESSURE: 67 MMHG | DIASTOLIC BLOOD PRESSURE: 43 MMHG

## 2020-10-21 VITALS — OXYGEN SATURATION: 65 %

## 2020-10-21 VITALS — OXYGEN SATURATION: 77 %

## 2020-10-21 VITALS — OXYGEN SATURATION: 93 %

## 2020-10-21 VITALS — OXYGEN SATURATION: 90 %

## 2020-10-21 VITALS — OXYGEN SATURATION: 95 % | DIASTOLIC BLOOD PRESSURE: 42 MMHG | SYSTOLIC BLOOD PRESSURE: 82 MMHG

## 2020-10-21 VITALS — OXYGEN SATURATION: 85 %

## 2020-10-21 VITALS — SYSTOLIC BLOOD PRESSURE: 83 MMHG | DIASTOLIC BLOOD PRESSURE: 43 MMHG

## 2020-10-21 LAB
ANION GAP SERPL CALC-SCNC: 8 MMOL/L (ref 7–16)
BASOPHILS NFR BLD MANUAL: 2 % (ref 0–2)
BUN SERPL-MCNC: 40 MG/DL (ref 7–17)
CALCIUM SERPL-MCNC: 8.5 MG/DL (ref 8.4–10.2)
CHLORIDE SERPL-SCNC: 110 MMOL/L (ref 98–107)
CO2 SERPL-SCNC: 16 MMOL/L (ref 22–30)
CREAT SERPL-SCNC: 3.13 UMOL/L (ref 0.52–1.25)
EOSINOPHIL NFR BLD: 5 % (ref 0–4)
ERYTHROCYTE [DISTWIDTH] IN BLOOD BY AUTOMATED COUNT: 17.7 % (ref 11.5–14.5)
GLUCOSE SERPL-MCNC: 72 MG/DL (ref 74–106)
HCT VFR BLD AUTO: 26.4 % (ref 37–47)
HGB BLD-MCNC: 8.7 G/DL (ref 12.5–16)
LYMPHOCYTES NFR BLD MANUAL: 55 % (ref 20–51)
MCH RBC QN AUTO: 33 PG (ref 27–31)
MCHC RBC AUTO-ENTMCNC: 33 G/DL (ref 33–37)
MCV RBC AUTO: 101 FL (ref 80–100)
MONOCYTES NFR BLD: 4 % (ref 1.7–9.3)
NEUTS SEG NFR BLD MANUAL: 34 % (ref 42–75.2)
PLATELET # BLD AUTO: 254 K/MM3 (ref 130–400)
PLATELET BLD QL SMEAR: NORMAL
PMV BLD AUTO: 9.7 FL (ref 7.4–10.4)
POTASSIUM SERPL-SCNC: 3.7 MMOL/L (ref 3.4–5)
RBC # BLD AUTO: 2.62 M/MM3 (ref 4.1–5.3)
SODIUM SERPL-SCNC: 135 MMOL/L (ref 137–145)

## 2020-10-21 NOTE — NUR
PT PLEASANT, AOX4, DENIES PAIN/NAUSEA AT THIS TIME. DR. SALMERON'S OFFICE
REPORTS 1 1/4INCH NEEDLE FOR PORT ACCESS. FLU SHOT GIVEN WITH OTHER
MEDICATIONS, PT ATE 1 SAUSAGE LINK AND HER MANDARIN ORANGES. ASSESSMENT
PERFORMED, BP LOW CONSISTENT THROUGHOUT NIGHT, PT ASYMPTOMATIC, NO OTHER
NEEDS.

## 2020-10-21 NOTE — NUR
Patient's BP 78/41. Recheck 71/36. Notified CORY Mchugh. Patient asymptomatic.
Increased fluids to 150 ml/hr.

## 2020-10-21 NOTE — NUR
Received report from SAMMI SNYDER. Patient transferred from unit via bed. Patient
oriented to room. Will continue to monitor for hypotension. Patient ICU
status.

## 2020-10-21 NOTE — NUR
Rt chest port accessed with 1 1/2" costello needle.  + blood return and IV fluids
switched to port access.  Pt tolerated well.

## 2020-10-21 NOTE — NUR
Patient continues to receive IV fluids per orders, and have cloudy yellow
urine via dependent drainage. Voices no questions, needs, or concerns at this
time. Resting in bed with call light within reach.

## 2020-10-21 NOTE — NUR
met with the patient to complete initial intake. The patient
lives in [place] with her , Walt. The patient has a rollator walker
and is independent with ADLs. The patient's PCP is Dr. Jaramillo and patient
receives medications from [facility] pharmacy with no difficulties. The
patient does not have advanced and was not interested in DPOA-HC form. The
patient plans to return home at discharge. The patient's Oncologist is Dr. Ngo. The patient receives chemo; treatment is two weeks on (Thursdays) and
one week off. SW contacted the patient's , Walt #[phone] to introduce
oneself and to provide update. He had no questions at this time. PT/OT
ordered.  The patient has transfer orders to the ICU.

## 2020-10-22 VITALS — DIASTOLIC BLOOD PRESSURE: 61 MMHG | SYSTOLIC BLOOD PRESSURE: 109 MMHG

## 2020-10-22 VITALS — OXYGEN SATURATION: 100 %

## 2020-10-22 VITALS — HEART RATE: 75 BPM | DIASTOLIC BLOOD PRESSURE: 51 MMHG | SYSTOLIC BLOOD PRESSURE: 84 MMHG

## 2020-10-22 VITALS — OXYGEN SATURATION: 84 %

## 2020-10-22 VITALS — OXYGEN SATURATION: 97 %

## 2020-10-22 VITALS — OXYGEN SATURATION: 99 %

## 2020-10-22 VITALS — OXYGEN SATURATION: 96 %

## 2020-10-22 VITALS — OXYGEN SATURATION: 95 %

## 2020-10-22 VITALS — OXYGEN SATURATION: 92 %

## 2020-10-22 VITALS — OXYGEN SATURATION: 74 %

## 2020-10-22 VITALS — OXYGEN SATURATION: 98 %

## 2020-10-22 VITALS — SYSTOLIC BLOOD PRESSURE: 86 MMHG | DIASTOLIC BLOOD PRESSURE: 49 MMHG | HEART RATE: 64 BPM | OXYGEN SATURATION: 95 %

## 2020-10-22 VITALS — OXYGEN SATURATION: 94 %

## 2020-10-22 VITALS — OXYGEN SATURATION: 91 %

## 2020-10-22 VITALS — OXYGEN SATURATION: 87 %

## 2020-10-22 VITALS
DIASTOLIC BLOOD PRESSURE: 58 MMHG | TEMPERATURE: 97.9 F | HEART RATE: 69 BPM | OXYGEN SATURATION: 100 % | SYSTOLIC BLOOD PRESSURE: 107 MMHG

## 2020-10-22 VITALS — OXYGEN SATURATION: 93 %

## 2020-10-22 VITALS
DIASTOLIC BLOOD PRESSURE: 61 MMHG | OXYGEN SATURATION: 96 % | TEMPERATURE: 98.6 F | SYSTOLIC BLOOD PRESSURE: 97 MMHG | HEART RATE: 72 BPM

## 2020-10-22 VITALS — HEART RATE: 79 BPM | DIASTOLIC BLOOD PRESSURE: 61 MMHG | OXYGEN SATURATION: 100 % | SYSTOLIC BLOOD PRESSURE: 96 MMHG

## 2020-10-22 VITALS — SYSTOLIC BLOOD PRESSURE: 107 MMHG | HEART RATE: 76 BPM | DIASTOLIC BLOOD PRESSURE: 63 MMHG

## 2020-10-22 VITALS
OXYGEN SATURATION: 98 % | HEART RATE: 66 BPM | TEMPERATURE: 97.7 F | DIASTOLIC BLOOD PRESSURE: 58 MMHG | SYSTOLIC BLOOD PRESSURE: 107 MMHG

## 2020-10-22 VITALS — OXYGEN SATURATION: 89 %

## 2020-10-22 VITALS — DIASTOLIC BLOOD PRESSURE: 59 MMHG | SYSTOLIC BLOOD PRESSURE: 106 MMHG | HEART RATE: 80 BPM

## 2020-10-22 VITALS — TEMPERATURE: 98.1 F | SYSTOLIC BLOOD PRESSURE: 112 MMHG | HEART RATE: 69 BPM | DIASTOLIC BLOOD PRESSURE: 58 MMHG

## 2020-10-22 VITALS
HEART RATE: 87 BPM | DIASTOLIC BLOOD PRESSURE: 59 MMHG | SYSTOLIC BLOOD PRESSURE: 106 MMHG | OXYGEN SATURATION: 99 % | TEMPERATURE: 98.5 F

## 2020-10-22 VITALS — HEART RATE: 64 BPM | SYSTOLIC BLOOD PRESSURE: 98 MMHG | TEMPERATURE: 98.2 F | DIASTOLIC BLOOD PRESSURE: 50 MMHG

## 2020-10-22 VITALS — OXYGEN SATURATION: 90 %

## 2020-10-22 VITALS — OXYGEN SATURATION: 88 %

## 2020-10-22 VITALS — OXYGEN SATURATION: 86 %

## 2020-10-22 VITALS — OXYGEN SATURATION: 72 %

## 2020-10-22 LAB
ANION GAP SERPL CALC-SCNC: 6 MMOL/L (ref 7–16)
BUN SERPL-MCNC: 24 MG/DL (ref 7–17)
CALCIUM SERPL-MCNC: 8.1 MG/DL (ref 8.4–10.2)
CHLORIDE SERPL-SCNC: 115 MMOL/L (ref 98–107)
CO2 SERPL-SCNC: 15 MMOL/L (ref 22–30)
CREAT SERPL-SCNC: 1.89 UMOL/L (ref 0.52–1.25)
ERYTHROCYTE [DISTWIDTH] IN BLOOD BY AUTOMATED COUNT: 17.7 % (ref 11.5–14.5)
GLUCOSE SERPL-MCNC: 92 MG/DL (ref 74–106)
HCT VFR BLD AUTO: 25.4 % (ref 37–47)
HGB BLD-MCNC: 8.2 G/DL (ref 12.5–16)
MCH RBC QN AUTO: 33 PG (ref 27–31)
MCHC RBC AUTO-ENTMCNC: 32 G/DL (ref 33–37)
MCV RBC AUTO: 101 FL (ref 80–100)
PLATELET # BLD AUTO: 270 K/MM3 (ref 130–400)
PMV BLD AUTO: 9.2 FL (ref 7.4–10.4)
POTASSIUM SERPL-SCNC: 3.5 MMOL/L (ref 3.4–5)
RBC # BLD AUTO: 2.52 M/MM3 (ref 4.1–5.3)
SODIUM SERPL-SCNC: 136 MMOL/L (ref 137–145)

## 2020-10-22 NOTE — NUR
DR LYONS AT BEDSIDE DISCUSSING POC WITH PT AND ASSESSMENT. PHYSICIAN DISCUSSES
NORMAL BP WITH PT, PPT STATES KARSTEN SBP IS 80-95. PHYSICIAN OK TO TITRATE
LEVOPHED DOWN ACCORDING TO THAT SBP. WILL MONITOR CLOSELY.

## 2020-10-22 NOTE — NUR
met with patient to discuss PT/OT recommendation for Home Health
Services. Patient states she doesn't feel she would need skilled nursing, but
may be interested in PT/OT as she's been feeling weak. Patient would like time
to think about it. SW provided Medicare.gov list of  agencies to review and
will follow up.

## 2020-10-22 NOTE — NUR
RECEIVED REPORT FROM SAMMI COLLINS. PT SITTING UP IN BED. BREAKFAST GIVEN. FC
PATENT AND DRAINING TO GRAVTY. PT ON RA. CALL LIGHT WITHN REACH. VSS.

## 2020-10-22 NOTE — NUR
PT BUMPED RT HAND JUAN FRANCISCO BEDSIDE TABLE AND CREATED A SKIN TEAR. CLEANED UP,
XEROFORM PLACED AND GAUZE WRAP ON. PT STATES SHE GETS THEM EASILY AND BLEEDS
EASIL USUALLY. WILL WATCH CLOSELY FOR BLEEDING.

## 2020-10-23 VITALS — OXYGEN SATURATION: 99 %

## 2020-10-23 VITALS
OXYGEN SATURATION: 95 % | TEMPERATURE: 97.9 F | DIASTOLIC BLOOD PRESSURE: 39 MMHG | SYSTOLIC BLOOD PRESSURE: 90 MMHG | HEART RATE: 100 BPM

## 2020-10-23 VITALS — OXYGEN SATURATION: 95 %

## 2020-10-23 VITALS — OXYGEN SATURATION: 98 %

## 2020-10-23 VITALS — OXYGEN SATURATION: 100 %

## 2020-10-23 VITALS — DIASTOLIC BLOOD PRESSURE: 49 MMHG | SYSTOLIC BLOOD PRESSURE: 72 MMHG | OXYGEN SATURATION: 98 %

## 2020-10-23 VITALS — OXYGEN SATURATION: 97 %

## 2020-10-23 VITALS — OXYGEN SATURATION: 96 %

## 2020-10-23 VITALS — DIASTOLIC BLOOD PRESSURE: 69 MMHG | SYSTOLIC BLOOD PRESSURE: 114 MMHG | HEART RATE: 114 BPM

## 2020-10-23 VITALS
OXYGEN SATURATION: 99 % | SYSTOLIC BLOOD PRESSURE: 94 MMHG | DIASTOLIC BLOOD PRESSURE: 53 MMHG | TEMPERATURE: 98.1 F | HEART RATE: 108 BPM

## 2020-10-23 VITALS — OXYGEN SATURATION: 93 %

## 2020-10-23 VITALS
SYSTOLIC BLOOD PRESSURE: 82 MMHG | DIASTOLIC BLOOD PRESSURE: 56 MMHG | TEMPERATURE: 98.1 F | OXYGEN SATURATION: 92 % | HEART RATE: 96 BPM

## 2020-10-23 VITALS — DIASTOLIC BLOOD PRESSURE: 87 MMHG | HEART RATE: 94 BPM | SYSTOLIC BLOOD PRESSURE: 97 MMHG | TEMPERATURE: 98.5 F

## 2020-10-23 VITALS — SYSTOLIC BLOOD PRESSURE: 78 MMHG | HEART RATE: 96 BPM | DIASTOLIC BLOOD PRESSURE: 47 MMHG

## 2020-10-23 VITALS — DIASTOLIC BLOOD PRESSURE: 52 MMHG | SYSTOLIC BLOOD PRESSURE: 95 MMHG | OXYGEN SATURATION: 38 % | HEART RATE: 102 BPM

## 2020-10-23 VITALS — OXYGEN SATURATION: 78 %

## 2020-10-23 VITALS — OXYGEN SATURATION: 82 %

## 2020-10-23 VITALS — OXYGEN SATURATION: 94 %

## 2020-10-23 VITALS — OXYGEN SATURATION: 92 %

## 2020-10-23 VITALS — OXYGEN SATURATION: 85 %

## 2020-10-23 VITALS — DIASTOLIC BLOOD PRESSURE: 46 MMHG | OXYGEN SATURATION: 97 % | HEART RATE: 105 BPM | SYSTOLIC BLOOD PRESSURE: 76 MMHG

## 2020-10-23 VITALS — OXYGEN SATURATION: 84 %

## 2020-10-23 VITALS — OXYGEN SATURATION: 53 %

## 2020-10-23 VITALS
DIASTOLIC BLOOD PRESSURE: 59 MMHG | TEMPERATURE: 97.5 F | HEART RATE: 96 BPM | OXYGEN SATURATION: 99 % | SYSTOLIC BLOOD PRESSURE: 111 MMHG

## 2020-10-23 VITALS — DIASTOLIC BLOOD PRESSURE: 46 MMHG | TEMPERATURE: 97.5 F | HEART RATE: 86 BPM | SYSTOLIC BLOOD PRESSURE: 93 MMHG

## 2020-10-23 VITALS — OXYGEN SATURATION: 89 %

## 2020-10-23 VITALS — DIASTOLIC BLOOD PRESSURE: 65 MMHG | SYSTOLIC BLOOD PRESSURE: 81 MMHG

## 2020-10-23 VITALS — SYSTOLIC BLOOD PRESSURE: 78 MMHG | DIASTOLIC BLOOD PRESSURE: 51 MMHG

## 2020-10-23 VITALS — OXYGEN SATURATION: 91 %

## 2020-10-23 VITALS — OXYGEN SATURATION: 87 %

## 2020-10-23 VITALS — OXYGEN SATURATION: 83 %

## 2020-10-23 VITALS — OXYGEN SATURATION: 71 %

## 2020-10-23 VITALS — OXYGEN SATURATION: 90 %

## 2020-10-23 LAB
ANION GAP SERPL CALC-SCNC: 7 MMOL/L (ref 7–16)
BASOPHILS # BLD: 0 10*3/UL (ref 0–0.2)
BASOPHILS NFR BLD AUTO: 0.4 % (ref 0–2)
BUN SERPL-MCNC: 12 MG/DL (ref 7–17)
CALCIUM SERPL-MCNC: 8.1 MG/DL (ref 8.4–10.2)
CHLORIDE SERPL-SCNC: 115 MMOL/L (ref 98–107)
CO2 SERPL-SCNC: 15 MMOL/L (ref 22–30)
CREAT SERPL-SCNC: 1.31 UMOL/L (ref 0.52–1.25)
CRP SERPL-MCNC: 2.9 MG/DL (ref 0–0.9)
EOSINOPHIL # BLD: 0.1 10*3/UL (ref 0–0.7)
EOSINOPHIL NFR BLD: 2.6 % (ref 0–4)
ERYTHROCYTE [DISTWIDTH] IN BLOOD BY AUTOMATED COUNT: 18.3 % (ref 11.5–14.5)
GLUCOSE SERPL-MCNC: 81 MG/DL (ref 74–106)
GRANULOCYTES # BLD AUTO: 74.1 % (ref 42.2–75.2)
HCT VFR BLD AUTO: 27.2 % (ref 37–47)
HGB BLD-MCNC: 8.8 G/DL (ref 12.5–16)
LYMPHOCYTES # BLD: 0.4 10*3/UL (ref 1.2–3.4)
LYMPHOCYTES NFR BLD: 18.5 % (ref 20–51)
MCH RBC QN AUTO: 33 PG (ref 27–31)
MCHC RBC AUTO-ENTMCNC: 32 G/DL (ref 33–37)
MCV RBC AUTO: 102 FL (ref 80–100)
MONOCYTES # BLD: 0.1 10*3/UL (ref 0.1–0.6)
MONOCYTES NFR BLD AUTO: 4.4 % (ref 1.7–9.3)
NEUTROPHILS # BLD: 1.7 10*3/UL (ref 1.4–6.5)
PLATELET # BLD AUTO: 241 K/MM3 (ref 130–400)
PMV BLD AUTO: 9.5 FL (ref 7.4–10.4)
POTASSIUM SERPL-SCNC: 3.7 MMOL/L (ref 3.4–5)
RBC # BLD AUTO: 2.67 M/MM3 (ref 4.1–5.3)
SODIUM SERPL-SCNC: 137 MMOL/L (ref 137–145)
TSH SERPL DL<=0.005 MIU/L-ACNC: 0.32 UIU/ML (ref 0.47–4.68)

## 2020-10-23 NOTE — NUR
received a phone call from patient's , Walt. HAILEE and Walt
discussed Home Health services and Walt expressed he may be interested in this
for patient but will follow up with her. Walt inquired about additional
insurance coverage if patient would qualify. HAILEE consulted Laura, Financial
Counselor.

## 2020-10-23 NOTE — NUR
Dr Ngo did call me back and advise that he will do more research on
Rosalina's treatment program with regard to her ongoing nausea/vomitting and
taste variations which seem to get better and then worse.  He did report that
her tumor markers are trending down.  He will plan on talking with her on
Monday and will discuss side effect management as well as other options for
treatment that might be available to her.  I did talk with the patient about
this and she was agreeable.

## 2020-10-24 VITALS — OXYGEN SATURATION: 97 %

## 2020-10-24 VITALS — OXYGEN SATURATION: 99 %

## 2020-10-24 VITALS — OXYGEN SATURATION: 94 %

## 2020-10-24 VITALS — OXYGEN SATURATION: 98 %

## 2020-10-24 VITALS — OXYGEN SATURATION: 96 %

## 2020-10-24 VITALS — OXYGEN SATURATION: 93 %

## 2020-10-24 VITALS — OXYGEN SATURATION: 95 %

## 2020-10-24 VITALS — OXYGEN SATURATION: 97 % | SYSTOLIC BLOOD PRESSURE: 86 MMHG | DIASTOLIC BLOOD PRESSURE: 51 MMHG

## 2020-10-24 VITALS — OXYGEN SATURATION: 100 %

## 2020-10-24 VITALS
SYSTOLIC BLOOD PRESSURE: 95 MMHG | TEMPERATURE: 99.5 F | HEART RATE: 94 BPM | OXYGEN SATURATION: 98 % | DIASTOLIC BLOOD PRESSURE: 52 MMHG

## 2020-10-24 VITALS
HEART RATE: 96 BPM | SYSTOLIC BLOOD PRESSURE: 101 MMHG | TEMPERATURE: 99.7 F | OXYGEN SATURATION: 98 % | DIASTOLIC BLOOD PRESSURE: 66 MMHG

## 2020-10-24 VITALS — DIASTOLIC BLOOD PRESSURE: 65 MMHG | SYSTOLIC BLOOD PRESSURE: 110 MMHG

## 2020-10-24 VITALS — SYSTOLIC BLOOD PRESSURE: 104 MMHG | DIASTOLIC BLOOD PRESSURE: 52 MMHG

## 2020-10-24 VITALS — OXYGEN SATURATION: 83 %

## 2020-10-24 VITALS — OXYGEN SATURATION: 78 %

## 2020-10-24 VITALS
DIASTOLIC BLOOD PRESSURE: 62 MMHG | HEART RATE: 98 BPM | OXYGEN SATURATION: 99 % | SYSTOLIC BLOOD PRESSURE: 96 MMHG | TEMPERATURE: 100 F

## 2020-10-24 VITALS — SYSTOLIC BLOOD PRESSURE: 96 MMHG | DIASTOLIC BLOOD PRESSURE: 32 MMHG | OXYGEN SATURATION: 97 %

## 2020-10-24 VITALS
OXYGEN SATURATION: 98 % | TEMPERATURE: 99 F | DIASTOLIC BLOOD PRESSURE: 76 MMHG | HEART RATE: 100 BPM | SYSTOLIC BLOOD PRESSURE: 99 MMHG

## 2020-10-24 VITALS — OXYGEN SATURATION: 89 %

## 2020-10-24 VITALS — OXYGEN SATURATION: 92 %

## 2020-10-24 VITALS — SYSTOLIC BLOOD PRESSURE: 84 MMHG | DIASTOLIC BLOOD PRESSURE: 63 MMHG

## 2020-10-24 VITALS — SYSTOLIC BLOOD PRESSURE: 86 MMHG | DIASTOLIC BLOOD PRESSURE: 52 MMHG

## 2020-10-24 VITALS — OXYGEN SATURATION: 84 %

## 2020-10-24 VITALS
HEART RATE: 109 BPM | TEMPERATURE: 100 F | DIASTOLIC BLOOD PRESSURE: 53 MMHG | OXYGEN SATURATION: 100 % | SYSTOLIC BLOOD PRESSURE: 99 MMHG

## 2020-10-24 VITALS — HEART RATE: 101 BPM | TEMPERATURE: 98.6 F | SYSTOLIC BLOOD PRESSURE: 109 MMHG | DIASTOLIC BLOOD PRESSURE: 59 MMHG

## 2020-10-24 VITALS — OXYGEN SATURATION: 69 %

## 2020-10-24 VITALS — OXYGEN SATURATION: 82 %

## 2020-10-24 VITALS — SYSTOLIC BLOOD PRESSURE: 108 MMHG | DIASTOLIC BLOOD PRESSURE: 65 MMHG

## 2020-10-24 VITALS — OXYGEN SATURATION: 87 %

## 2020-10-24 VITALS — OXYGEN SATURATION: 77 %

## 2020-10-24 VITALS — DIASTOLIC BLOOD PRESSURE: 44 MMHG | SYSTOLIC BLOOD PRESSURE: 86 MMHG

## 2020-10-24 VITALS — OXYGEN SATURATION: 90 %

## 2020-10-24 VITALS — OXYGEN SATURATION: 98 % | DIASTOLIC BLOOD PRESSURE: 78 MMHG | SYSTOLIC BLOOD PRESSURE: 108 MMHG

## 2020-10-24 VITALS — OXYGEN SATURATION: 85 %

## 2020-10-24 LAB
ALBUMIN SERPL-MCNC: 2.4 GM/DL (ref 3.5–5)
ALP SERPL-CCNC: 59 U/L (ref 50–136)
ALT SERPL-CCNC: 14 U/L (ref 4–34)
ANION GAP SERPL CALC-SCNC: 5 MMOL/L (ref 7–16)
ANISOCYTOSIS BLD QL: (no result)
AST SERPL-CCNC: 49 U/L (ref 15–37)
BILIRUB DIRECT SERPL-MCNC: 0.1 MG/DL (ref 0–0.4)
BILIRUB INDIRECT SERPL-MCNC: 0.3 MG/DL (ref 0–1.1)
BILIRUB SERPL-MCNC: 0.4 MG/DL (ref 0–1)
BUN SERPL-MCNC: 15 MG/DL (ref 7–17)
CALCIUM SERPL-MCNC: 7.3 MG/DL (ref 8.4–10.2)
CHLORIDE SERPL-SCNC: 111 MMOL/L (ref 98–107)
CO2 SERPL-SCNC: 16 MMOL/L (ref 22–30)
CREAT SERPL-SCNC: 1.63 UMOL/L (ref 0.52–1.25)
ERYTHROCYTE [DISTWIDTH] IN BLOOD BY AUTOMATED COUNT: 18.3 % (ref 11.5–14.5)
GLUCOSE SERPL-MCNC: 98 MG/DL (ref 74–106)
HCT VFR BLD AUTO: 25.9 % (ref 37–47)
HGB BLD-MCNC: 8.4 G/DL (ref 12.5–16)
HYPOCHROMIA BLD QL SMEAR: (no result)
LYMPHOCYTES NFR BLD MANUAL: 13 % (ref 20–51)
MCH RBC QN AUTO: 32 PG (ref 27–31)
MCHC RBC AUTO-ENTMCNC: 32 G/DL (ref 33–37)
MCV RBC AUTO: 99 FL (ref 80–100)
METAMYELOCYTES NFR BLD MANUAL: 3 % (ref 0–0)
MONOCYTES NFR BLD: 25 % (ref 1.7–9.3)
NEUTS BAND NFR BLD: 24 % (ref 0–10)
NEUTS SEG NFR BLD MANUAL: 35 % (ref 42–75.2)
NRBC BLD AUTO-RTO: 1 % (ref 0–6)
PH UR STRIP.AUTO: 5 [PH] (ref 5–8)
PLATELET # BLD AUTO: 181 K/MM3 (ref 130–400)
PLATELET BLD QL SMEAR: NORMAL
PMV BLD AUTO: 9.4 FL (ref 7.4–10.4)
POTASSIUM SERPL-SCNC: 3.5 MMOL/L (ref 3.4–5)
PROT SERPL-MCNC: 4.4 GM/DL (ref 6.4–8.2)
RBC # BLD AUTO: 2.61 M/MM3 (ref 4.1–5.3)
RBC # UR STRIP.AUTO: (no result) /UL
SODIUM SERPL-SCNC: 132 MMOL/L (ref 137–145)
SP GR UR STRIP.AUTO: 1.01 (ref 1–1.03)
SQUAMOUS # URNS: (no result) /HPF
UA DIPSTICK PNL UR STRIP.AUTO: (no result)
URINE LEUKOCYTE ESTERASE: (no result)
URN COLLECT METHOD CLASS: (no result)
WBC # UR: >50 /HPF

## 2020-10-24 NOTE — NUR
PT HAVING NAUSEA AND DRY HEAVES. HR UP 'S DURING.  NOTIFIED
AND ORDER RECEVED FOR ONE TIME IV CAMILLE.

## 2020-10-24 NOTE — NUR
PT TAKEN TO CT. WHEN MOVING BACK TO BED FROM CT PT'S PORT WAS PULLED. PORT
ABLE TO FLUSH BUT NO BLOOD RETURN. PORT DE-ACCESSED AND RE-ACCESSED BY HOUSE
SUPERVISOR.

## 2020-10-25 VITALS — OXYGEN SATURATION: 99 %

## 2020-10-25 VITALS — OXYGEN SATURATION: 91 %

## 2020-10-25 VITALS — OXYGEN SATURATION: 92 %

## 2020-10-25 VITALS — OXYGEN SATURATION: 97 %

## 2020-10-25 VITALS — OXYGEN SATURATION: 100 %

## 2020-10-25 VITALS — OXYGEN SATURATION: 94 %

## 2020-10-25 VITALS — OXYGEN SATURATION: 98 %

## 2020-10-25 VITALS — OXYGEN SATURATION: 79 %

## 2020-10-25 VITALS
OXYGEN SATURATION: 97 % | HEART RATE: 99 BPM | DIASTOLIC BLOOD PRESSURE: 61 MMHG | SYSTOLIC BLOOD PRESSURE: 95 MMHG | TEMPERATURE: 98.3 F

## 2020-10-25 VITALS — OXYGEN SATURATION: 96 %

## 2020-10-25 VITALS — OXYGEN SATURATION: 93 %

## 2020-10-25 VITALS — SYSTOLIC BLOOD PRESSURE: 115 MMHG | OXYGEN SATURATION: 100 % | DIASTOLIC BLOOD PRESSURE: 71 MMHG

## 2020-10-25 VITALS — OXYGEN SATURATION: 95 %

## 2020-10-25 VITALS
OXYGEN SATURATION: 94 % | DIASTOLIC BLOOD PRESSURE: 67 MMHG | SYSTOLIC BLOOD PRESSURE: 99 MMHG | HEART RATE: 94 BPM | TEMPERATURE: 98.6 F

## 2020-10-25 VITALS
DIASTOLIC BLOOD PRESSURE: 62 MMHG | OXYGEN SATURATION: 92 % | TEMPERATURE: 97.8 F | HEART RATE: 91 BPM | SYSTOLIC BLOOD PRESSURE: 90 MMHG

## 2020-10-25 VITALS — DIASTOLIC BLOOD PRESSURE: 76 MMHG | SYSTOLIC BLOOD PRESSURE: 130 MMHG | OXYGEN SATURATION: 98 %

## 2020-10-25 VITALS — OXYGEN SATURATION: 70 %

## 2020-10-25 VITALS — OXYGEN SATURATION: 72 %

## 2020-10-25 VITALS — OXYGEN SATURATION: 83 %

## 2020-10-25 VITALS
DIASTOLIC BLOOD PRESSURE: 58 MMHG | TEMPERATURE: 98.2 F | OXYGEN SATURATION: 99 % | SYSTOLIC BLOOD PRESSURE: 89 MMHG | HEART RATE: 97 BPM

## 2020-10-25 VITALS — DIASTOLIC BLOOD PRESSURE: 59 MMHG | TEMPERATURE: 98 F | HEART RATE: 107 BPM | SYSTOLIC BLOOD PRESSURE: 88 MMHG

## 2020-10-25 VITALS — OXYGEN SATURATION: 88 %

## 2020-10-25 VITALS — SYSTOLIC BLOOD PRESSURE: 86 MMHG | HEART RATE: 96 BPM | DIASTOLIC BLOOD PRESSURE: 51 MMHG | OXYGEN SATURATION: 99 %

## 2020-10-25 VITALS — OXYGEN SATURATION: 90 %

## 2020-10-25 VITALS
SYSTOLIC BLOOD PRESSURE: 95 MMHG | OXYGEN SATURATION: 98 % | TEMPERATURE: 98.3 F | DIASTOLIC BLOOD PRESSURE: 71 MMHG | HEART RATE: 109 BPM

## 2020-10-25 VITALS — OXYGEN SATURATION: 86 %

## 2020-10-25 VITALS — OXYGEN SATURATION: 84 %

## 2020-10-25 VITALS — OXYGEN SATURATION: 75 %

## 2020-10-25 VITALS — OXYGEN SATURATION: 82 %

## 2020-10-25 VITALS — OXYGEN SATURATION: 80 %

## 2020-10-25 VITALS — OXYGEN SATURATION: 87 %

## 2020-10-25 VITALS — OXYGEN SATURATION: 85 %

## 2020-10-25 LAB
ANION GAP SERPL CALC-SCNC: 9 MMOL/L (ref 7–16)
ANISOCYTOSIS BLD QL: (no result)
BASE EXCESS BLDA CALC-SCNC: -10.9 MMOL/L (ref -2–2)
BASOPHILS NFR BLD MANUAL: 1 % (ref 0–2)
BUN SERPL-MCNC: 20 MG/DL (ref 7–17)
BURR CELLS BLD QL SMEAR: (no result)
CALCIUM SERPL-MCNC: 7.1 MG/DL (ref 8.4–10.2)
CHLORIDE SERPL-SCNC: 107 MMOL/L (ref 98–107)
CO2 BLDA-SCNC: 14.2 MMOL/L
CO2 SERPL-SCNC: 13 MMOL/L (ref 22–30)
CREAT SERPL-SCNC: 2.29 UMOL/L (ref 0.52–1.25)
EOSINOPHIL NFR BLD: 5 % (ref 0–4)
ERYTHROCYTE [DISTWIDTH] IN BLOOD BY AUTOMATED COUNT: 18.3 % (ref 11.5–14.5)
GLUCOSE SERPL-MCNC: 104 MG/DL (ref 74–106)
HCO3 BLDA-SCNC: 13.4 MEQ/L (ref 22–26)
HCT VFR BLD AUTO: 21 % (ref 37–47)
HCT VFR BLD AUTO: 22.3 % (ref 37–47)
HGB BLD-MCNC: 7 G/DL (ref 12.5–16)
HGB BLD-MCNC: 7.4 G/DL (ref 12.5–16)
INHALED O2 CONCENTRATION: 21 %
LYMPHOCYTES NFR BLD MANUAL: 10 % (ref 20–51)
MAGNESIUM SERPL-MCNC: 0.9 MG/DL (ref 1.6–2.3)
MCH RBC QN AUTO: 33 PG (ref 27–31)
MCHC RBC AUTO-ENTMCNC: 33 G/DL (ref 33–37)
MCV RBC AUTO: 101 FL (ref 80–100)
METAMYELOCYTES NFR BLD MANUAL: 1 % (ref 0–0)
MONOCYTES NFR BLD: 32 % (ref 1.7–9.3)
MYELOCYTES NFR BLD MANUAL: 2 % (ref 0–0)
NEUTS BAND NFR BLD: 24 % (ref 0–10)
NEUTS SEG NFR BLD MANUAL: 25 % (ref 42–75.2)
NRBC BLD AUTO-RTO: 1 % (ref 0–6)
PCO2 BLDA: 24.7 MMHG (ref 35–45)
PLATELET # BLD AUTO: 105 K/MM3 (ref 130–400)
PLATELET BLD QL SMEAR: (no result)
PMV BLD AUTO: 10 FL (ref 7.4–10.4)
PO2 BLDA: 55.3 MMHG (ref 80–100)
POTASSIUM SERPL-SCNC: 4.1 MMOL/L (ref 3.4–5)
RBC # BLD AUTO: 2.22 M/MM3 (ref 4.1–5.3)
SAO2 % BLDA: 88.4 % (ref 92–100)
SCHISTOCYTES BLD QL SMEAR: (no result)
SODIUM SERPL-SCNC: 130 MMOL/L (ref 137–145)

## 2020-10-25 NOTE — NUR
Notified DARLINE of patient's total urine output of 100mL since shift change at
1900. No orders received at this time. Will continue to monitor.

## 2020-10-25 NOTE — NUR
Notified Margarette of patient's blood culture results. Awaiting further orders.
Will continue to monitor.

## 2020-10-25 NOTE — NUR
Followed up with lab regarding ordered unit of irradiated PRBCs. Unit should
arrive by tomorrow morning. Will continue to monitor.

## 2020-10-25 NOTE — NUR
Clarified DNR/DNI code status with patient and family. Patient does not wish
to receive compressions or to be intubated.

## 2020-10-25 NOTE — NUR
Notified DARLINE of patient's urine output of 45mL since shift change at 1900.
Patient is reporting increased work of breathing. She is tachypneic with
labored breathing at rest. Awaiting further orders at this time.
 
Notified DARLINE at 2104 that ordered unit of irradiated PRBCs will not arrive
until tomorrow morning.

## 2020-10-26 VITALS — OXYGEN SATURATION: 97 %

## 2020-10-26 VITALS — OXYGEN SATURATION: 98 %

## 2020-10-26 VITALS — OXYGEN SATURATION: 99 %

## 2020-10-26 VITALS
HEART RATE: 109 BPM | OXYGEN SATURATION: 97 % | SYSTOLIC BLOOD PRESSURE: 87 MMHG | TEMPERATURE: 97.7 F | DIASTOLIC BLOOD PRESSURE: 54 MMHG

## 2020-10-26 VITALS — OXYGEN SATURATION: 96 %

## 2020-10-26 VITALS — OXYGEN SATURATION: 95 %

## 2020-10-26 VITALS — OXYGEN SATURATION: 94 %

## 2020-10-26 VITALS
OXYGEN SATURATION: 96 % | TEMPERATURE: 97.5 F | DIASTOLIC BLOOD PRESSURE: 65 MMHG | SYSTOLIC BLOOD PRESSURE: 98 MMHG | HEART RATE: 98 BPM

## 2020-10-26 VITALS
OXYGEN SATURATION: 95 % | SYSTOLIC BLOOD PRESSURE: 96 MMHG | DIASTOLIC BLOOD PRESSURE: 62 MMHG | HEART RATE: 95 BPM | TEMPERATURE: 97.7 F

## 2020-10-26 VITALS — OXYGEN SATURATION: 92 %

## 2020-10-26 VITALS — OXYGEN SATURATION: 91 %

## 2020-10-26 VITALS — OXYGEN SATURATION: 90 %

## 2020-10-26 VITALS — OXYGEN SATURATION: 88 %

## 2020-10-26 VITALS — OXYGEN SATURATION: 75 %

## 2020-10-26 VITALS — OXYGEN SATURATION: 100 %

## 2020-10-26 VITALS — OXYGEN SATURATION: 69 %

## 2020-10-26 VITALS — DIASTOLIC BLOOD PRESSURE: 66 MMHG | SYSTOLIC BLOOD PRESSURE: 101 MMHG

## 2020-10-26 VITALS — DIASTOLIC BLOOD PRESSURE: 58 MMHG | TEMPERATURE: 97.2 F | SYSTOLIC BLOOD PRESSURE: 75 MMHG | HEART RATE: 104 BPM

## 2020-10-26 VITALS — DIASTOLIC BLOOD PRESSURE: 52 MMHG | OXYGEN SATURATION: 96 % | SYSTOLIC BLOOD PRESSURE: 82 MMHG

## 2020-10-26 VITALS — HEART RATE: 102 BPM | SYSTOLIC BLOOD PRESSURE: 101 MMHG | TEMPERATURE: 97.1 F | DIASTOLIC BLOOD PRESSURE: 66 MMHG

## 2020-10-26 VITALS
TEMPERATURE: 97.4 F | DIASTOLIC BLOOD PRESSURE: 60 MMHG | SYSTOLIC BLOOD PRESSURE: 98 MMHG | HEART RATE: 109 BPM | OXYGEN SATURATION: 95 %

## 2020-10-26 VITALS — OXYGEN SATURATION: 89 %

## 2020-10-26 VITALS — OXYGEN SATURATION: 65 %

## 2020-10-26 VITALS — OXYGEN SATURATION: 93 %

## 2020-10-26 VITALS — OXYGEN SATURATION: 80 %

## 2020-10-26 VITALS — SYSTOLIC BLOOD PRESSURE: 93 MMHG | HEART RATE: 92 BPM | TEMPERATURE: 97.7 F | DIASTOLIC BLOOD PRESSURE: 60 MMHG

## 2020-10-26 VITALS — TEMPERATURE: 98.1 F | SYSTOLIC BLOOD PRESSURE: 94 MMHG | HEART RATE: 97 BPM | DIASTOLIC BLOOD PRESSURE: 83 MMHG

## 2020-10-26 VITALS — OXYGEN SATURATION: 87 %

## 2020-10-26 VITALS — OXYGEN SATURATION: 85 %

## 2020-10-26 VITALS — HEART RATE: 97 BPM | DIASTOLIC BLOOD PRESSURE: 72 MMHG | OXYGEN SATURATION: 97 % | SYSTOLIC BLOOD PRESSURE: 103 MMHG

## 2020-10-26 VITALS — OXYGEN SATURATION: 72 %

## 2020-10-26 VITALS — OXYGEN SATURATION: 71 %

## 2020-10-26 VITALS — OXYGEN SATURATION: 82 %

## 2020-10-26 VITALS — OXYGEN SATURATION: 70 %

## 2020-10-26 VITALS
DIASTOLIC BLOOD PRESSURE: 52 MMHG | SYSTOLIC BLOOD PRESSURE: 82 MMHG | OXYGEN SATURATION: 96 % | HEART RATE: 109 BPM | TEMPERATURE: 97.7 F

## 2020-10-26 VITALS — DIASTOLIC BLOOD PRESSURE: 63 MMHG | SYSTOLIC BLOOD PRESSURE: 91 MMHG | OXYGEN SATURATION: 94 % | HEART RATE: 103 BPM

## 2020-10-26 VITALS — OXYGEN SATURATION: 63 %

## 2020-10-26 VITALS — TEMPERATURE: 97.7 F | SYSTOLIC BLOOD PRESSURE: 82 MMHG | HEART RATE: 104 BPM | DIASTOLIC BLOOD PRESSURE: 52 MMHG

## 2020-10-26 VITALS — OXYGEN SATURATION: 83 %

## 2020-10-26 VITALS — OXYGEN SATURATION: 74 %

## 2020-10-26 VITALS — SYSTOLIC BLOOD PRESSURE: 102 MMHG | DIASTOLIC BLOOD PRESSURE: 67 MMHG

## 2020-10-26 VITALS — OXYGEN SATURATION: 68 %

## 2020-10-26 VITALS — OXYGEN SATURATION: 76 %

## 2020-10-26 VITALS — OXYGEN SATURATION: 84 %

## 2020-10-26 VITALS — OXYGEN SATURATION: 79 %

## 2020-10-26 VITALS — OXYGEN SATURATION: 81 %

## 2020-10-26 VITALS — OXYGEN SATURATION: 86 %

## 2020-10-26 VITALS — OXYGEN SATURATION: 78 %

## 2020-10-26 VITALS — OXYGEN SATURATION: 73 %

## 2020-10-26 VITALS — OXYGEN SATURATION: 66 %

## 2020-10-26 LAB
ALBUMIN SERPL-MCNC: 2.1 GM/DL (ref 3.5–5)
ALP SERPL-CCNC: 54 U/L (ref 50–136)
ALT SERPL-CCNC: 17 U/L (ref 4–34)
ANION GAP SERPL CALC-SCNC: 7 MMOL/L (ref 7–16)
ANISOCYTOSIS BLD QL: (no result)
AST SERPL-CCNC: 25 U/L (ref 15–37)
BASE EXCESS BLDA CALC-SCNC: -9.1 MMOL/L (ref -2–2)
BILIRUB SERPL-MCNC: 0.6 MG/DL (ref 0–1)
BUN SERPL-MCNC: 27 MG/DL (ref 7–17)
CALCIUM SERPL-MCNC: 7.4 MG/DL (ref 8.4–10.2)
CHLORIDE SERPL-SCNC: 102 MMOL/L (ref 98–107)
CHOLEST SPEC-SCNC: 91 MG/DL (ref 120–200)
CO2 BLDA-SCNC: 16.1 MMOL/L
CO2 SERPL-SCNC: 17 MMOL/L (ref 22–30)
CREAT SERPL-SCNC: 2.73 UMOL/L (ref 0.52–1.25)
EOSINOPHIL NFR BLD: 1 % (ref 0–4)
ERYTHROCYTE [DISTWIDTH] IN BLOOD BY AUTOMATED COUNT: 18.3 % (ref 11.5–14.5)
GAS PNL BLDA: 4
GLUCOSE SERPL-MCNC: 106 MG/DL (ref 74–106)
HCO3 BLDA-SCNC: 15.3 MEQ/L (ref 22–26)
HCT VFR BLD AUTO: 19.5 % (ref 37–47)
HCT VFR BLD AUTO: 21.8 % (ref 37–47)
HGB BLD-MCNC: 6.5 G/DL (ref 12.5–16)
HGB BLD-MCNC: 7.5 G/DL (ref 12.5–16)
HYPOCHROMIA BLD QL SMEAR: (no result)
INR BLD: 1.5 (ref 0.8–3)
LYMPHOCYTES NFR BLD MANUAL: 8 % (ref 20–51)
MAGNESIUM SERPL-MCNC: 1.7 MG/DL (ref 1.6–2.3)
MCH RBC QN AUTO: 33 PG (ref 27–31)
MCHC RBC AUTO-ENTMCNC: 33 G/DL (ref 33–37)
MCV RBC AUTO: 98 FL (ref 80–100)
METAMYELOCYTES NFR BLD MANUAL: 5 % (ref 0–0)
MONOCYTES NFR BLD: 9 % (ref 1.7–9.3)
NEUTS BAND NFR BLD: 24 % (ref 0–10)
NEUTS SEG NFR BLD MANUAL: 53 % (ref 42–75.2)
OVALOCYTES BLD QL SMEAR: (no result)
PATH REV BLD -IMP: (no result)
PCO2 BLDA: 27.2 MMHG (ref 35–45)
PHOSPHATE SERPL-MCNC: 3 MG/DL (ref 2.5–4.5)
PLATELET # BLD AUTO: 50 K/MM3 (ref 130–400)
PLATELET BLD QL SMEAR: (no result)
PMV BLD AUTO: 11 FL (ref 7.4–10.4)
PO2 BLDA: 77.6 MMHG (ref 80–100)
POTASSIUM SERPL-SCNC: 3.9 MMOL/L (ref 3.4–5)
PRE ALBUMIN: 7.9 MG/DL (ref 17.6–36)
PROT SERPL-MCNC: 4.2 GM/DL (ref 6.4–8.2)
PROTHROMBIN TIME: 16.4 SECONDS (ref 9.7–12.8)
RBC # BLD AUTO: 2 M/MM3 (ref 4.1–5.3)
SAO2 % BLDA: 95.4 % (ref 92–100)
SCHISTOCYTES BLD QL SMEAR: (no result)
SODIUM SERPL-SCNC: 127 MMOL/L (ref 137–145)
TRIGL SERPL-MCNC: 158 MG/DL
URINE PROTEIN:CREAT RATIO: 19.71 (ref 0–0.14)

## 2020-10-26 PROCEDURE — 02HV33Z INSERTION OF INFUSION DEVICE INTO SUPERIOR VENA CAVA, PERCUTANEOUS APPROACH: ICD-10-PCS

## 2020-10-26 NOTE — NUR
I talked with family this morning at bedside.  They have been able to discuss
a lot of Rosalina's wishes over the weekend and will have a chance to talk with
Dr Ngo at 4pm today regarding prognosis.  Pt has been switched to DNR code
status.  Nausea and vomitting continue to be a problem.  Pt did require bipap
earlier today but is relieved to be off of it at least for a while.  
is waiting for  to present DPOA-HC paperwork so that this can be
completed.  Daughter is very supportive at bedside.

## 2020-10-26 NOTE — NUR
Notified DARLINE of patient's critical hemoglobin of 6.5 and total urine output
of 150mL since shift change at 1900. Patient continues on levophed, now at
0.04 mcg/kg/min or 13.1 mL/hr; and vasopressin at 0.04 units/min or 12 mL/hr.
Liter of sodium bicarb has just finished infusing at 0430, with LR resumed at
75 mL/hr. Patient exhibiting improved work of breathing. She states her
breathing feels easier although her upper and lower extremities are still 1-2+
edematous. No orders received at this time. Will continue to monitor.

## 2020-10-26 NOTE — NUR
attended clinical rounds with the team. The patient is on BiPAP.
Laura Ridley with finance contacted this SW regarding the patient's insurance
application. Laura to contact the patient's .

## 2020-10-26 NOTE — NUR
The patient was interested in completing advanced directives. 
provided Declaration and DPOA-HC form. SW and the patient's nurse witnessed.
The patient designated her , Walt and her daughter, Alba. A copy was
placed in the chart. The original and copies provided to the patient.

## 2020-10-27 VITALS — OXYGEN SATURATION: 98 %

## 2020-10-27 VITALS — OXYGEN SATURATION: 95 %

## 2020-10-27 VITALS — OXYGEN SATURATION: 94 %

## 2020-10-27 VITALS — OXYGEN SATURATION: 88 %

## 2020-10-27 VITALS — OXYGEN SATURATION: 93 %

## 2020-10-27 VITALS — OXYGEN SATURATION: 78 %

## 2020-10-27 VITALS — OXYGEN SATURATION: 96 %

## 2020-10-27 VITALS
OXYGEN SATURATION: 96 % | HEART RATE: 101 BPM | DIASTOLIC BLOOD PRESSURE: 60 MMHG | SYSTOLIC BLOOD PRESSURE: 102 MMHG | TEMPERATURE: 97.7 F

## 2020-10-27 VITALS — OXYGEN SATURATION: 91 %

## 2020-10-27 VITALS — OXYGEN SATURATION: 99 %

## 2020-10-27 VITALS — OXYGEN SATURATION: 100 %

## 2020-10-27 VITALS — OXYGEN SATURATION: 89 %

## 2020-10-27 VITALS — OXYGEN SATURATION: 97 %

## 2020-10-27 VITALS — OXYGEN SATURATION: 86 %

## 2020-10-27 VITALS — SYSTOLIC BLOOD PRESSURE: 92 MMHG | DIASTOLIC BLOOD PRESSURE: 60 MMHG | TEMPERATURE: 97.6 F | HEART RATE: 108 BPM

## 2020-10-27 VITALS — OXYGEN SATURATION: 92 %

## 2020-10-27 VITALS
SYSTOLIC BLOOD PRESSURE: 95 MMHG | TEMPERATURE: 97.5 F | OXYGEN SATURATION: 92 % | HEART RATE: 107 BPM | DIASTOLIC BLOOD PRESSURE: 65 MMHG

## 2020-10-27 VITALS — OXYGEN SATURATION: 90 %

## 2020-10-27 VITALS — OXYGEN SATURATION: 71 %

## 2020-10-27 VITALS — TEMPERATURE: 98.4 F | HEART RATE: 99 BPM | DIASTOLIC BLOOD PRESSURE: 48 MMHG | SYSTOLIC BLOOD PRESSURE: 101 MMHG

## 2020-10-27 VITALS — OXYGEN SATURATION: 62 %

## 2020-10-27 VITALS
DIASTOLIC BLOOD PRESSURE: 67 MMHG | TEMPERATURE: 97.7 F | HEART RATE: 108 BPM | OXYGEN SATURATION: 89 % | SYSTOLIC BLOOD PRESSURE: 94 MMHG

## 2020-10-27 VITALS — OXYGEN SATURATION: 84 %

## 2020-10-27 VITALS — SYSTOLIC BLOOD PRESSURE: 86 MMHG | TEMPERATURE: 97.4 F | HEART RATE: 108 BPM | DIASTOLIC BLOOD PRESSURE: 55 MMHG

## 2020-10-27 VITALS
HEART RATE: 114 BPM | SYSTOLIC BLOOD PRESSURE: 96 MMHG | TEMPERATURE: 97.7 F | DIASTOLIC BLOOD PRESSURE: 71 MMHG | OXYGEN SATURATION: 89 %

## 2020-10-27 VITALS — OXYGEN SATURATION: 85 %

## 2020-10-27 VITALS — OXYGEN SATURATION: 52 %

## 2020-10-27 VITALS
DIASTOLIC BLOOD PRESSURE: 58 MMHG | TEMPERATURE: 97.7 F | SYSTOLIC BLOOD PRESSURE: 98 MMHG | HEART RATE: 112 BPM | OXYGEN SATURATION: 95 %

## 2020-10-27 VITALS — OXYGEN SATURATION: 76 %

## 2020-10-27 VITALS — OXYGEN SATURATION: 83 %

## 2020-10-27 VITALS — OXYGEN SATURATION: 69 %

## 2020-10-27 VITALS — HEART RATE: 105 BPM | DIASTOLIC BLOOD PRESSURE: 58 MMHG | TEMPERATURE: 97.8 F | SYSTOLIC BLOOD PRESSURE: 91 MMHG

## 2020-10-27 VITALS
SYSTOLIC BLOOD PRESSURE: 96 MMHG | HEART RATE: 110 BPM | DIASTOLIC BLOOD PRESSURE: 65 MMHG | OXYGEN SATURATION: 98 % | TEMPERATURE: 97.8 F

## 2020-10-27 VITALS — SYSTOLIC BLOOD PRESSURE: 100 MMHG | TEMPERATURE: 97.7 F | DIASTOLIC BLOOD PRESSURE: 61 MMHG | HEART RATE: 100 BPM

## 2020-10-27 VITALS — SYSTOLIC BLOOD PRESSURE: 102 MMHG | DIASTOLIC BLOOD PRESSURE: 60 MMHG | TEMPERATURE: 97.7 F | HEART RATE: 101 BPM

## 2020-10-27 VITALS — OXYGEN SATURATION: 82 %

## 2020-10-27 VITALS — OXYGEN SATURATION: 81 %

## 2020-10-27 VITALS — OXYGEN SATURATION: 87 %

## 2020-10-27 VITALS — OXYGEN SATURATION: 43 %

## 2020-10-27 VITALS — OXYGEN SATURATION: 79 %

## 2020-10-27 VITALS — DIASTOLIC BLOOD PRESSURE: 42 MMHG | SYSTOLIC BLOOD PRESSURE: 101 MMHG | TEMPERATURE: 98.1 F | HEART RATE: 103 BPM

## 2020-10-27 VITALS — OXYGEN SATURATION: 77 %

## 2020-10-27 VITALS — OXYGEN SATURATION: 67 %

## 2020-10-27 VITALS — OXYGEN SATURATION: 80 %

## 2020-10-27 VITALS — OXYGEN SATURATION: 73 %

## 2020-10-27 VITALS — OXYGEN SATURATION: 55 %

## 2020-10-27 LAB
ALBUMIN SERPL-MCNC: 2.1 GM/DL (ref 3.5–5)
ALP SERPL-CCNC: 50 U/L (ref 50–136)
ALT SERPL-CCNC: 13 U/L (ref 4–34)
ANION GAP SERPL CALC-SCNC: 7 MMOL/L (ref 7–16)
ANISOCYTOSIS BLD QL: (no result)
AST SERPL-CCNC: 17 U/L (ref 15–37)
BASE EXCESS BLDA CALC-SCNC: -6.8 MMOL/L (ref -2–2)
BILIRUB SERPL-MCNC: 0.4 MG/DL (ref 0–1)
BUN SERPL-MCNC: 37 MG/DL (ref 7–17)
CALCIUM SERPL-MCNC: 7.8 MG/DL (ref 8.4–10.2)
CHLORIDE SERPL-SCNC: 101 MMOL/L (ref 98–107)
CO2 SERPL-SCNC: 20 MMOL/L (ref 22–30)
CREAT SERPL-SCNC: 3.13 UMOL/L (ref 0.52–1.25)
DEPRECATED D DIMER PPP IA-ACNC: 2620 NG/MLDDU (ref 200–230)
ERYTHROCYTE [DISTWIDTH] IN BLOOD BY AUTOMATED COUNT: 18.5 % (ref 11.5–14.5)
ERYTHROCYTE [DISTWIDTH] IN BLOOD BY AUTOMATED COUNT: 18.6 % (ref 11.5–14.5)
FIBRINOGEN PPP-MCNC: 258 MG/DL (ref 200–450)
GAS PNL BLDA: 3
GLUCOSE SERPL-MCNC: 123 MG/DL (ref 74–106)
HCO3 BLDA-SCNC: 17.9 MEQ/L (ref 22–26)
HCT VFR BLD AUTO: 21.7 % (ref 37–47)
HCT VFR BLD AUTO: 22 % (ref 37–47)
HGB BLD-MCNC: 7.5 G/DL (ref 12.5–16)
HGB BLD-MCNC: 7.6 G/DL (ref 12.5–16)
INR BLD: 1.2 (ref 0.8–3)
LYMPHOCYTES NFR BLD MANUAL: 13 % (ref 20–51)
LYMPHOCYTES NFR BLD MANUAL: 14 % (ref 20–51)
MAGNESIUM SERPL-MCNC: 2.2 MG/DL (ref 1.6–2.3)
MCH RBC QN AUTO: 32 PG (ref 27–31)
MCH RBC QN AUTO: 32 PG (ref 27–31)
MCHC RBC AUTO-ENTMCNC: 35 G/DL (ref 33–37)
MCHC RBC AUTO-ENTMCNC: 35 G/DL (ref 33–37)
MCV RBC AUTO: 93 FL (ref 80–100)
MCV RBC AUTO: 93 FL (ref 80–100)
METAMYELOCYTES NFR BLD MANUAL: 2 % (ref 0–0)
MONOCYTES NFR BLD: 4 % (ref 1.7–9.3)
MONOCYTES NFR BLD: 6 % (ref 1.7–9.3)
MYELOCYTES NFR BLD MANUAL: 1 % (ref 0–0)
NEUTS BAND NFR BLD: 11 % (ref 0–10)
NEUTS BAND NFR BLD: 19 % (ref 0–10)
NEUTS SEG NFR BLD MANUAL: 60 % (ref 42–75.2)
NEUTS SEG NFR BLD MANUAL: 70 % (ref 42–75.2)
NRBC BLD AUTO-RTO: 1 % (ref 0–6)
OVALOCYTES BLD QL SMEAR: (no result)
PCO2 BLDA: 32.5 MMHG (ref 35–45)
PHOSPHATE SERPL-MCNC: 2.3 MG/DL (ref 2.5–4.5)
PLATELET # BLD AUTO: 24 K/MM3 (ref 130–400)
PLATELET # BLD AUTO: 24 K/MM3 (ref 130–400)
PLATELET BLD QL SMEAR: (no result)
PLATELET BLD QL SMEAR: (no result)
PMV BLD AUTO: 12 FL (ref 7.4–10.4)
PMV BLD AUTO: 13.2 FL (ref 7.4–10.4)
PO2 BLDA: 73.4 MMHG (ref 80–100)
POTASSIUM SERPL-SCNC: 3.2 MMOL/L (ref 3.4–5)
PROT SERPL-MCNC: 4.2 GM/DL (ref 6.4–8.2)
PROTHROMBIN TIME: 13.5 SECONDS (ref 9.7–12.8)
RBC # BLD AUTO: 2.34 M/MM3 (ref 4.1–5.3)
RBC # BLD AUTO: 2.37 M/MM3 (ref 4.1–5.3)
SAO2 % BLDA: 94.3 % (ref 92–100)
SCHISTOCYTES BLD QL SMEAR: (no result)
SODIUM SERPL-SCNC: 128 MMOL/L (ref 137–145)

## 2020-10-27 NOTE — NUR
Comfort quilt provided to pt with explanation.  Settling down and trying to
nap soon.  Family remains at bedside.

## 2020-10-27 NOTE — NUR
PT ASSISTED X2 BACK TO BED FROM RECLINER. PT NEEDS A LOT OF ASSISTANCE TO GET
BACK INTO BED. PT STATES SHE FEELS VERY WEAK. POX NOTED TO BE 80% AFTER
GETTING SETTLED INTO BED. INCREASED TO 6L VIA NC. RT NOTIFIED. WILL MONITOR PT
AND MAKE ADJUSTMENTS.

## 2020-10-27 NOTE — NUR
DR SHAY'S NURSE PATSY,RN NOTIFIED OF ELECTROLYTES AND DR LOPEZ'S TPN
SUGGESTIONS. NURSE STATES HER OR RN WILL NOTIFY DR OLEA.

## 2020-10-27 NOTE — NUR
PT'S DAUGHTER CALLS RN TO BEDSIDE IN A PANIC. PT STATES SHE FELT LIKE SHE WAS
GOING TO PASS OUT, HR NOTED TO  AND RR HIGH 20s. DAUGHTER STATES PT DID
BECOME A LITTLE UNRESPONSIVE. SPOKE WITH MT AND CHECKED CLINICAL ACCESS, NOTED
PT WAS ON VBIF AT 1101 AND APPEARS ICD SHOCKED HER BACK. DR CHIU NOTIFIED AND
COMES TO BEDSIDE TO CHECK WITH INTERROGATER TO FIND EPISODE. NEW ORDERS
RECEIVED. DR CHIU STATES HE WOULD LIKE PT TO RECEIVE NEXT PRBC.

## 2020-10-27 NOTE — NUR
RECEIVED REPORT FROM SAMMI COLLINS. PT SITTING UP IN BED PLAYING ON PHONE ON 3L
VIA NC. VSS. TPN NOTED. FC PATENT AND DRAINING TO GRAVITY. CALL LIGHT WITHIN
REACH.

## 2020-10-27 NOTE — NUR
CALLED DARLINE AND SPOKE TO SAMMI GRAFF AS DOCTOR WAS ON ANOTHER LINE. PLATELETS
REPORTED. POTASSIUM IS LOW AS WELL AT 3.2 AND PHOS AT 2.3. CREATININE AND BUN
ELEVATED THIS MORNING SO WILL WAIT FOR DAY SHIFT DOCTOR TO SEE IF THEY WANT
POTASSIUM REPLACED AND DARLINE NURSE AGREED WITH PLAN. AWAITING FOR ORDERS
REGARDING PLATELETS AT THIS TIME.

## 2020-10-27 NOTE — NUR
Recieved call from Dr Ngo to report that there is very little left for
treatment options from his perspective and he is encouraging pt to wait at
least three weeks giving herself a chance to get stronger before they talk
further about chemotherapy.  He feels that palliative care referral is quite
appropriate.

## 2020-10-27 NOTE — NUR
REPORT GIVEN TO SAMMI NELSON ONSURGICAL. PT TO TRANSFER .  WITH
PT. ALL PERSONAL BELONGINGS SENT WITH PT.

## 2020-10-27 NOTE — NUR
UPDATED DR LOPEZ ABOUT PTS LABS: K, PHOS, AND DDIMER. PHYSCIAIN MADE AWARE OF
DARLINE'S HIT WORKUP DONE, PHYSICIAN STATES IT IS OK TO GIVE ABX STILL AT THIS
TIME. SPOKE TO DR LOPEZ ABOUT UO, ECHO RESULTS, AND POSSIBEL TPN CHANGES.
PHYSICIAN STATES TO SPEAK TO DR OLEA ABOUT TPN ADJUSTMENTS R/T ELECTROLYTES.

## 2020-10-27 NOTE — NUR
Pt had a defibrilator firing and has also spoken to Dr Osman about her
situation.  At this point, She and her family have decided to go home with
hospice services.  They do want to get the unit of blood that was ordered for
her, and it is infusing now.  The defibulator was discussed and at this point
the family would like to leave it on until they get home and are ready.  I
faxed information referral to Cori at [facility] along with request
for oxygen, wheelchair and commode for use at home.   plans to take
leave from his job to be home with his wife, along with support from their
daughter.   had already spoken with Cori by phone at [facility] to set up their transition to hospice services tomorrow.  They will
plan to transport by private car with our staff helping her into the car and
hospice staff there to help get her out of the car and settled in the house.
This was also conveyed to Elan, .

## 2020-10-27 NOTE — NUR
FAMILY AND PT STATES TO RN THAT THEY HAVE DECIDED THEY WANT TO GO HOME ON
HOSPICE. PALLIATIVE CARE NURSE NOTIFIED.

## 2020-10-27 NOTE — NUR
Arrived to surgical floor. Assessment complete. Lungs coarse throughout. Heart
sounds normal. Bowels active x4. Pulses present throughout. Generalized edema
+2. PICC to right upper and PAC to right chest flushed without complications.
Denies pain at this time. Orientated to surgical floor. Would like bipap at
bedside. Respiratory notified. Denies other needs at this time.  at
bedside.

## 2020-10-27 NOTE — NUR
contacted [facility]i with [facility] to review the discharge
plan. They will admit the patient on Wednesday, 10/28 at 1300 for hospice
services.  The patient needs to be picked up for transport at 12:30.  HAILEE
contacted [email or web address] regarding [facility]. Greg with [facility] [[email or web address] reports they can be pick
up the patient at 12:30 on 10/28. SW met with the patient's  and
daughter to review the plan. They are in agreeance with the above plan. HAILEE
collaborated the above information with the patient's nurse.

## 2020-10-27 NOTE — NUR
DR. COE CALLED BACK AND WILL START WORK-UP FPR POSSIBLE DIC, HIT AND TTPHUS.
MD WILL PUT LAB ORDERS IN. THIS RN WILL PASS ALONG TO ONCOMING DAY SHIFT
REGARDING PLAN.

## 2020-10-27 NOTE — NUR
DR LYONS NOITIFIED FO VFIB INCIDENT AND DR CHIU REQUEST FOR PRBC, NEW ORDER
RECEIVED. BLOOD BANK NOTIFIED.

## 2020-10-27 NOTE — NUR
Spoke with patient and her daughter this morning about their visit with Dr Ngo.  Pt reports that she felt they got more of their questions answered.
From the conversation, she reports that she is going to take a month off
chemotherapy to get over what is going on now and then they will talk more
about chemotherapy as "she needs to keep taking chemotherapy".  Pt does look
better today and reports she is feeling a bit better.  Still using mints and
butterscotch to help with her taste sensations.  Reports she ate hashbrowns
this morning and they went down well.  Support provided.

## 2020-10-27 NOTE — NUR
HAILEE FRANCO NOTIFIED OF DR LYONS'S REQUEST OF HOME HEALTH AIDES AVAILABLE IN THE
AREA TO ASSIST WITH HOME HSOPICE CARE.

## 2020-10-27 NOTE — NUR
staffed with Jacqueline Queen, Palliative Care Nurse regarding the
patient's discharge. The patient and family have decided to go home with
[facility]care [facility] Hosp[facility] services tomorrow, 10/28.  Jacqueline faxed referral to Cori.
The patient is needing oxygen, a wheelchair, and a bedside commode. Cori is
aware of these patient needs. The patient plans to be transported via private
vehicle.  HAILEE met with the patient's , Walt to review the above discharge
plan. He has been in contact with [facility]i regarding the plan. [facility] staff will be at the home when the patient arrives to assist the
patient out of the vehicle and into the home. Walt plans to take leave from
work to be home with the patient.  HAILEE attempted to contact Cori to review the
above plan, left message. HAILEE collaborated the above information with the
patient's nurse.

## 2020-10-27 NOTE — NUR
PT'S PLATELET DROPPED TO 24 TODAY. EVELYN CARBAJAL NOTIFIED AND WANTS THIS RN TO
CALL E-ICU AND CHECK WITH THEM. PT HAS NO COMPALINTS AT THIS TIME, VSS AND NO
SIGNS OF BLEEDING. HEPARIN ON HOLD SINCE YESTERDAY.

## 2020-10-27 NOTE — NUR
SPOKE TO DR LYONS ABOUT TPN TO STOP AND CAN STOP FSBS, ADD ROXANOL AND KEEP IV
MORPHINE AND MERRUM AT THIS TIME.

## 2020-10-28 VITALS — DIASTOLIC BLOOD PRESSURE: 60 MMHG | SYSTOLIC BLOOD PRESSURE: 98 MMHG | HEART RATE: 102 BPM | TEMPERATURE: 97.4 F

## 2020-10-28 VITALS — HEART RATE: 107 BPM | TEMPERATURE: 97.3 F | SYSTOLIC BLOOD PRESSURE: 100 MMHG | DIASTOLIC BLOOD PRESSURE: 57 MMHG

## 2020-10-28 VITALS — SYSTOLIC BLOOD PRESSURE: 96 MMHG | DIASTOLIC BLOOD PRESSURE: 56 MMHG | HEART RATE: 104 BPM | TEMPERATURE: 97.4 F

## 2020-10-28 NOTE — NUR
Patient Health Summary, Discharge Summary, and Home Meds printed and reviewed
with patient and wife.  Stressed importance of follow up appointments.
Belongings gathered by  including phone,  and glasses.  Patient
transported via EMT stretcher to home to receive Hospice Care.  She was
seatbelted in ambulance.  Patient and  denied any questions.

## 2020-10-28 NOTE — NUR
Patient to discharge home today on hospice services from [facility].
HAILEE Ibrahim set up transport with [facility] and they will  at
1230. HAILEE contacted  EMS to confirm  time and to advise that patient
moved from ICU to Surgical. HAILEE and SAMMI Phillips met with patient who states she
is ready to get home. Patient's , Walt is at bedside and reports they
have a lot of family coming in to be with patient and provide support. Don
provided signature on transfer consent form for EMS transport. HAILEE placed EMS
forms in chart and provided copy to unit clerk once they were signed by JESSICA Renteria. HAILEE contacted Cori at [facility] and faxed discharge orders.
Cori advised that HAILEE Randhawa from [facility] will arrive at patient's home around 1300
to complete intake paperwork with the family. HAILEE provided transport time to
[facility].

## 2020-10-28 NOTE — NUR
Reported 5/10 back pain. Provided with PRN morphine. Denies other needs at
this time. Call light in reach.

## 2020-10-28 NOTE — NUR
Pt has requested to have her defibulator device shut off at a later time, once
she has been home for a while.  Ramirez with [facility] reports that they can
call [phone] and request this and then the hospice nurse can coordinate
her visit with [facility] to do this.  An order for "Turn off high power
device and alerts" will be needed on the chart.  This number will be passed on
to the patient and her .

## 2020-10-28 NOTE — NUR
Patient required x2 doses of morphine for pain control throughout night.
Otherwise uneventful night. Resting in bed this AM. Call light in reach.

## 2025-05-27 NOTE — NUR
----- Message from Seferino sent at 5/27/2025  1:19 PM CDT -----  Regarding: self  Type: Patient Call BackWho called:selfWhat is the request in detail: patient requesting a refill of her rosuvastatin (CRESTOR) [id number] - [place]SA YIP - [address] [place] [address] AT [address] [place] [address] [id number] [facility] [id number] [address]Zqght: [phone] Fax: [phone] the clinic reply by [facility]? NoWould the patient rather a call back or a response via [facility]? Call [facility] call back number:[phone] Information:Thank you.   Notified Dr. Hampton of patient's hemoglobin of 7. Received orders to transfuse
one unit of irradiated PRBCs and to follow up with DARLINE.